# Patient Record
Sex: FEMALE | Race: WHITE | Employment: UNEMPLOYED | URBAN - METROPOLITAN AREA
[De-identification: names, ages, dates, MRNs, and addresses within clinical notes are randomized per-mention and may not be internally consistent; named-entity substitution may affect disease eponyms.]

---

## 2019-01-28 ENCOUNTER — HOSPITAL ENCOUNTER (EMERGENCY)
Age: 24
Discharge: HOME | End: 2019-01-29
Attending: EMERGENCY MEDICINE
Payer: COMMERCIAL

## 2019-01-28 VITALS
TEMPERATURE: 98.7 F | RESPIRATION RATE: 16 BRPM | HEART RATE: 82 BPM | HEIGHT: 64 IN | BODY MASS INDEX: 27.31 KG/M2 | WEIGHT: 160 LBS | OXYGEN SATURATION: 100 %

## 2019-01-28 DIAGNOSIS — N93.8 DUB (DYSFUNCTIONAL UTERINE BLEEDING): Primary | ICD-10-CM

## 2019-01-28 LAB
HCG, URINE, POC: NEGATIVE
Lab: NORMAL
NEGATIVE QC PASS/FAIL: NORMAL
POSITIVE QC PASS/FAIL: NORMAL

## 2019-01-28 PROCEDURE — 99283 EMERGENCY DEPT VISIT LOW MDM: CPT

## 2019-01-28 ASSESSMENT — ENCOUNTER SYMPTOMS
PHOTOPHOBIA: 0
WHEEZING: 0
CHEST TIGHTNESS: 0
VOMITING: 0
SHORTNESS OF BREATH: 0
ABDOMINAL DISTENTION: 0
EYE DISCHARGE: 0
COUGH: 0
SORE THROAT: 0
ABDOMINAL PAIN: 0

## 2019-01-28 ASSESSMENT — PAIN DESCRIPTION - PAIN TYPE: TYPE: ACUTE PAIN

## 2019-01-28 ASSESSMENT — PAIN DESCRIPTION - ORIENTATION: ORIENTATION: LOWER

## 2019-01-28 ASSESSMENT — PAIN SCALES - GENERAL: PAINLEVEL_OUTOF10: 7

## 2019-01-28 ASSESSMENT — PAIN DESCRIPTION - LOCATION: LOCATION: ABDOMEN

## 2019-01-28 ASSESSMENT — PAIN DESCRIPTION - DESCRIPTORS: DESCRIPTORS: ACHING;CRAMPING

## 2021-09-27 ENCOUNTER — HOSPITAL ENCOUNTER (EMERGENCY)
Age: 26
Discharge: LEFT BEFORE DISCHARGE | End: 2021-09-27
Payer: COMMERCIAL

## 2022-07-04 ENCOUNTER — APPOINTMENT (OUTPATIENT)
Dept: GENERAL RADIOLOGY | Age: 27
End: 2022-07-04
Payer: COMMERCIAL

## 2022-07-04 ENCOUNTER — HOSPITAL ENCOUNTER (EMERGENCY)
Age: 27
Discharge: HOME | End: 2022-07-04
Payer: COMMERCIAL

## 2022-07-04 VITALS
SYSTOLIC BLOOD PRESSURE: 109 MMHG | WEIGHT: 160 LBS | OXYGEN SATURATION: 99 % | HEART RATE: 79 BPM | BODY MASS INDEX: 27.31 KG/M2 | TEMPERATURE: 97.5 F | HEIGHT: 64 IN | RESPIRATION RATE: 19 BRPM | DIASTOLIC BLOOD PRESSURE: 77 MMHG

## 2022-07-04 DIAGNOSIS — S89.91XA INJURY OF RIGHT KNEE, INITIAL ENCOUNTER: Primary | ICD-10-CM

## 2022-07-04 PROCEDURE — 96372 THER/PROPH/DIAG INJ SC/IM: CPT

## 2022-07-04 PROCEDURE — 73564 X-RAY EXAM KNEE 4 OR MORE: CPT

## 2022-07-04 PROCEDURE — 99284 EMERGENCY DEPT VISIT MOD MDM: CPT

## 2022-07-04 PROCEDURE — 6360000002 HC RX W HCPCS: Performed by: STUDENT IN AN ORGANIZED HEALTH CARE EDUCATION/TRAINING PROGRAM

## 2022-07-04 RX ORDER — KETOROLAC TROMETHAMINE 30 MG/ML
30 INJECTION, SOLUTION INTRAMUSCULAR; INTRAVENOUS ONCE
Status: COMPLETED | OUTPATIENT
Start: 2022-07-04 | End: 2022-07-04

## 2022-07-04 RX ADMIN — KETOROLAC TROMETHAMINE 30 MG: 30 INJECTION, SOLUTION INTRAMUSCULAR; INTRAVENOUS at 15:11

## 2022-07-04 ASSESSMENT — PAIN DESCRIPTION - FREQUENCY: FREQUENCY: CONTINUOUS

## 2022-07-04 ASSESSMENT — PAIN DESCRIPTION - PAIN TYPE: TYPE: ACUTE PAIN

## 2022-07-04 ASSESSMENT — PAIN DESCRIPTION - LOCATION: LOCATION: KNEE

## 2022-07-04 ASSESSMENT — PAIN DESCRIPTION - ORIENTATION: ORIENTATION: RIGHT

## 2022-07-04 ASSESSMENT — PAIN SCALES - GENERAL
PAINLEVEL_OUTOF10: 7
PAINLEVEL_OUTOF10: 7

## 2022-07-04 ASSESSMENT — PAIN - FUNCTIONAL ASSESSMENT: PAIN_FUNCTIONAL_ASSESSMENT: 0-10

## 2022-07-04 ASSESSMENT — PAIN DESCRIPTION - DESCRIPTORS: DESCRIPTORS: PATIENT UNABLE TO DESCRIBE

## 2022-07-04 NOTE — ED TRIAGE NOTES
Pt presents to ED from home with c/o right knee pain. Pt reports that she fell 1 hour PTA, twisting her right knee. Pt reports that her knee cap popped out of place and went back in. Pt reports that she has been unable to ambulate since injury. Upon assessment, pt is A/Ox4, skin appropriate for ethnicity/w/d, respirations even and unlabored, msp's intact. Pt denies chest pain, SOB, n/v/d, fever, and chills.

## 2022-07-07 ENCOUNTER — OFFICE VISIT (OUTPATIENT)
Dept: ORTHOPEDIC SURGERY | Age: 27
End: 2022-07-07
Payer: COMMERCIAL

## 2022-07-07 VITALS
WEIGHT: 160 LBS | OXYGEN SATURATION: 100 % | HEART RATE: 73 BPM | HEIGHT: 64 IN | BODY MASS INDEX: 27.31 KG/M2 | TEMPERATURE: 98.5 F

## 2022-07-07 DIAGNOSIS — S83.004A DISLOCATION OF RIGHT PATELLA, INITIAL ENCOUNTER: Primary | ICD-10-CM

## 2022-07-07 PROCEDURE — G8427 DOCREV CUR MEDS BY ELIG CLIN: HCPCS | Performed by: ORTHOPAEDIC SURGERY

## 2022-07-07 PROCEDURE — 1036F TOBACCO NON-USER: CPT | Performed by: ORTHOPAEDIC SURGERY

## 2022-07-07 PROCEDURE — G8419 CALC BMI OUT NRM PARAM NOF/U: HCPCS | Performed by: ORTHOPAEDIC SURGERY

## 2022-07-07 PROCEDURE — 99204 OFFICE O/P NEW MOD 45 MIN: CPT | Performed by: ORTHOPAEDIC SURGERY

## 2022-07-07 ASSESSMENT — ENCOUNTER SYMPTOMS
WHEEZING: 0
VOMITING: 0
NAUSEA: 0
SHORTNESS OF BREATH: 0
COUGH: 0
ABDOMINAL PAIN: 0
DIARRHEA: 0
EYE PAIN: 0
CONSTIPATION: 0
EYE DISCHARGE: 0
EYE ITCHING: 0
PHOTOPHOBIA: 0
COUGH: 0
SHORTNESS OF BREATH: 0
ABDOMINAL PAIN: 0

## 2022-07-07 NOTE — ED PROVIDER NOTES
CURRENT MEDICATIONS       Discharge Medication List as of 7/4/2022  3:38 PM      CONTINUE these medications which have NOT CHANGED    Details   ibuprofen (ADVIL;MOTRIN) 600 MG tablet R-2      ferrous sulfate (FE TABS) 325 (65 FE) MG EC tablet Take 1 tablet by mouth 3 times daily (with meals), Disp-90 tablet, R-2             ALLERGIES     Patient has no known allergies. FAMILY HISTORY     History reviewed. No pertinent family history. SOCIAL HISTORY       Social History     Socioeconomic History    Marital status: Single     Spouse name: None    Number of children: None    Years of education: None    Highest education level: None   Occupational History    None   Tobacco Use    Smoking status: Never Smoker    Smokeless tobacco: Never Used   Substance and Sexual Activity    Alcohol use: No    Drug use: No    Sexual activity: Yes     Partners: Male   Other Topics Concern    None   Social History Narrative    None     Social Determinants of Health     Financial Resource Strain:     Difficulty of Paying Living Expenses: Not on file   Food Insecurity:     Worried About Running Out of Food in the Last Year: Not on file    Elza of Food in the Last Year: Not on file   Transportation Needs:     Lack of Transportation (Medical): Not on file    Lack of Transportation (Non-Medical):  Not on file   Physical Activity:     Days of Exercise per Week: Not on file    Minutes of Exercise per Session: Not on file   Stress:     Feeling of Stress : Not on file   Social Connections:     Frequency of Communication with Friends and Family: Not on file    Frequency of Social Gatherings with Friends and Family: Not on file    Attends Church Services: Not on file    Active Member of Clubs or Organizations: Not on file    Attends Club or Organization Meetings: Not on file    Marital Status: Not on file   Intimate Partner Violence:     Fear of Current or Ex-Partner: Not on file   [employer] Abused: Not on file    Physically Abused: Not on file    Sexually Abused: Not on file   Housing Stability:     Unable to Pay for Housing in the Last Year: Not on file    Number of [place] in the Last Year: Not on file    Unstable Housing in the Last Year: Not on file       SCREENINGS         [place] Coma Scale  Eye Opening: Spontaneous  Best Verbal Response: Oriented  Best Motor Response: Obeys commands  Lori Coma Scale Score: 15                     CIWA Assessment  BP: 109/77  Heart Rate: 79                 PHYSICAL EXAM    (up to 7 for level 4, 8 or more for level 5)     ED Triage Vitals [07/04/22 1447]   BP Temp Temp Source Heart Rate Resp SpO2 Height Weight   109/77 97.5 °F (36.4 °C) Temporal 79 19 99 % 5' 4\" (1.626 m) 160 lb (72.6 kg)       Physical Exam  Constitutional:       General: She is not in acute distress. Appearance: She is well-developed. She is not ill-appearing, toxic-appearing or diaphoretic. HENT:      Head: Normocephalic and atraumatic. Nose: Nose normal.      Mouth/Throat:      Mouth: Mucous membranes are moist.   Eyes:      Pupils: Pupils are equal, round, and reactive to light. Cardiovascular:      Rate and Rhythm: Normal rate and regular rhythm. Heart sounds: No murmur heard. No friction rub. No gallop. Pulmonary:      Effort: Pulmonary effort is normal.      Breath sounds: Normal breath sounds. Abdominal:      General: There is no distension. Tenderness: There is no abdominal tenderness. Musculoskeletal:         General: No swelling. Cervical back: Normal range of motion. Right knee: No swelling, deformity, effusion or erythema. Decreased range of motion. Tenderness present. Comments: RLE neurovascularly intact   Skin:     General: Skin is warm and dry. Neurological:      Mental Status: She is alert and oriented to person, place, and time.          DIAGNOSTIC RESULTS     EKG: All EKG's are interpreted by the Emergency Department Physician who either signs or Co-signs this chart in the absence of a cardiologist.    RADIOLOGY:   Non-plain film images such as CT, Ultrasound and MRI are read by the radiologist. Plain radiographic images are visualized and preliminarily interpreted by the emergency physician with the below findings:      Interpretation per the Radiologist below, if available at the time of this note:    XR KNEE RIGHT (MIN 4 VIEWS)   Final Result      No acute osseous abnormality. ED BEDSIDE ULTRASOUND:   Performed by ED Physician - none    LABS:  Labs Reviewed - No data to display    All other labs were within normal range or not returned as of this dictation. EMERGENCY DEPARTMENT COURSE and DIFFERENTIAL DIAGNOSIS/MDM:   Vitals:    Vitals:    07/04/22 1447   BP: 109/77   Pulse: 79   Resp: 19   Temp: 97.5 °F (36.4 °C)   TempSrc: Temporal   SpO2: 99%   Weight: 160 lb (72.6 kg)   Height: 5' 4\" (1.626 m)       MDM     Xray negative For acute abnormality. Patient given knee brace and crutches, referral to orthopedics for follow-up. Stable for discharge. Return to the ED for worsening symptoms. REASSESSMENT          CRITICAL CARE TIME   Total Critical Care time was 0 minutes, excluding separately reportable procedures. There was a high probability of clinically significant/life threatening deterioration in the patient's condition which required my urgent intervention. CONSULTS:  None    PROCEDURES:  Unless otherwise noted below, none     Procedures        FINAL IMPRESSION      1.  Injury of right knee, initial encounter          DISPOSITION/PLAN   DISPOSITION Decision To Discharge 07/04/2022 03:39:28 PM      PATIENT REFERRED TO:  [facility]) ED  [address] [facility]  [phone]  Go to   As needed, If symptoms worsen    Elizabet Kinsey  Schedule an appointment as soon as possible for a visit in 1 day  [phone] MEDICATIONS:  Discharge Medication List as of 7/4/2022  3:38 PM        Controlled Substances Monitoring:     No flowsheet data found.     (Please note that portions of this note were completed with a voice recognition program.  Efforts were made to edit the dictations but occasionally words are mis-transcribed.)    Olinda Lehman PA-C (electronically signed)             Olinda Lehman PA-C  07/07/22 7561

## 2022-07-07 NOTE — PROGRESS NOTES
Patient ID:  Santana Keller is a 27 y.o. female who presents today for evaluation of right knee pain. Injury: yes; Patient stumbled on , 3 days ago, and she reports that she sustained a medial patellar dislocation. She sought and felt it go back in. Metal Allergy: no    Location: right  knee pain, located on the anterior aspect of the knee  Pain: yes; 8 on a scale of 1 to 10  Onset: sudden  Duration: 3 days  Frequency:  occurs constantly  Quality: aching and boring   Swelling: patient notes continuous swelling of the joint  Aggravating factors: weight bearing activity, standing, walking and deep knee flexion  Alleviating factors: removing weight from leg and rest  Mechanical symptoms: none  Radiation: no    Activities: Walking with crutches  Restriction:  decreased ambulatory tolerance and Crutches  Progression:  constant    Previous treatment:  none  NSAIDs:  none  PT:  none    Medications:    Current Outpatient Medications on File Prior to Visit   Medication Sig Dispense Refill    ibuprofen (ADVIL;MOTRIN) 600 MG tablet   2    ferrous sulfate (FE TABS) 325 (65 FE) MG EC tablet Take 1 tablet by mouth 3 times daily (with meals) 90 tablet 2     No current facility-administered medications on file prior to visit. Allergies:    No Known Allergies    Past Medical History:    History reviewed. No pertinent past medical history.     Past Surgical History:    Past Surgical History:   Procedure Laterality Date     SECTION         Social History:    Social History     Socioeconomic History    Marital status: Single     Spouse name: Not on file    Number of children: Not on file    Years of education: Not on file    Highest education level: Not on file   Occupational History    Not on file   Tobacco Use    Smoking status: Never Smoker    Smokeless tobacco: Never Used   Substance and Sexual Activity    Alcohol use: No    Drug use: No    Sexual activity: Yes     Partners: Male   Other Topics Concern    Not on file   Social History Narrative    Not on file     Social Determinants of Health     Financial Resource Strain:     Difficulty of Paying Living Expenses: Not on file   Food Insecurity:     Worried About Running Out of Food in the Last Year: Not on file    Elza of Food in the Last Year: Not on file   Transportation Needs:     Lack of Transportation (Medical): Not on file    Lack of Transportation (Non-Medical): Not on file   Physical Activity:     Days of Exercise per Week: Not on file    Minutes of Exercise per Session: Not on file   Stress:     Feeling of Stress : Not on file   Social Connections:     Frequency of Communication with Friends and Family: Not on file    Frequency of Social Gatherings with Friends and Family: Not on file    Attends Denominational Services: Not on file    Active Member of [address] [place] [address] or Organizations: Not on file    Attends Club or Organization Meetings: Not on file    Marital Status: Not on file   Intimate Partner Violence:     Fear of Current or Ex-Partner: Not on file    Emotionally Abused: Not on file    Physically Abused: Not on file    Sexually Abused: Not on file   Housing Stability:     Unable to Pay for Housing in the Last Year: Not on file    Number of [place] in the Last Year: Not on file    Unstable Housing in the Last Year: Not on file       Family History:    History reviewed. No pertinent family history. Occupation:  Presently not working   - Occupational requirements:  none    Workers Compensation:  Have you missed work for this issue?  no  Is this issue being addressed under a worker's comp claim? no    Review of Systems:    Review of Systems   Constitutional: Negative for activity change, appetite change and chills. HENT: Negative for congestion, ear pain and hearing loss. Eyes: Negative for pain, discharge and itching. Respiratory: Negative for cough and shortness of breath.     Cardiovascular: Negative for chest pain and leg swelling. Gastrointestinal: Negative for abdominal pain, constipation and diarrhea. Endocrine: Negative for cold intolerance, heat intolerance and polydipsia. Genitourinary: Negative for difficulty urinating, flank pain and frequency. Skin: Negative for rash and wound. Allergic/Immunologic: Negative for environmental allergies and food allergies. Neurological: Negative for dizziness, seizures and syncope. Physical Exam:    Examination of the right knee    Gait:  antalgic gait affecting right  Inspection:  neutral  Swelling:  none  Erythema:  none  Ecchymosis:  none  Effusion:  2+  Palpation:  She has pain on palpation over the lateral aspect of the patella and the soft tissue lateral to the patella. Minimal pain on the distal medial femoral condyle  Extension:  5  Flexion:  70  Strength:  5  Varus/Valgus Instability:  none  Anterior/Posterior Instability:  none  Darin:  Unable to assess due to guarding  Thessaly:  Unable to assess due to guarding  Modified Apley:  Unable to assess due to guarding  Lachman:  negative  Patellar compression:  positive  Neurological/Vascular:  Sensation grossly intact. Dorsalis pedis palpable and 2+    Radiographs:  Radiographs of the right knee were personally reviewed, and they revealed:  FINDINGS:       No acute fracture or dislocation. Joint spaces of the knee are maintained. No knee joint effusion. Soft tissues are within normal limits.           Impression       No acute osseous abnormality. MRI: None    Diagnosis:   Diagnosis Orders   1. Dislocation of right patella, initial encounter  MRI KNEE RIGHT WO CONTRAST       Plan:    Patient has what sounds like a dislocation of the patella which spontaneously reduced. The patient is in significant discomfort. She came in with 1 crutch. I explained to her that she probably needs to use the other 1 and keep her weight off of the leg for right now.   Given the fact that I was not able to assess her for meniscal pathology, as well as the fact that she had a dislocated patella which could have resulted in some type of a chondral injury, I am going to put in for an MRI of the right knee. We will follow-up to discuss the findings. Orders Placed This Encounter   Procedures    MRI KNEE RIGHT WO CONTRAST     Standing Status:   Future     Standing Expiration Date:   7/7/2023     Scheduling Instructions:      MRI of right knee to assess for meniscal tear, loose osteochondral body, stress fracture     Order Specific Question:   Reason for exam:     Answer:   knee pain       No orders of the defined types were placed in this encounter. Return for MRI review.     Kyaw Madrigal MD

## 2022-07-15 ENCOUNTER — HOSPITAL ENCOUNTER (OUTPATIENT)
Dept: MRI IMAGING | Age: 27
Discharge: HOME | End: 2022-07-17
Payer: COMMERCIAL

## 2022-07-15 DIAGNOSIS — S83.004A DISLOCATION OF RIGHT PATELLA, INITIAL ENCOUNTER: ICD-10-CM

## 2022-07-15 PROCEDURE — 73721 MRI JNT OF LWR EXTRE W/O DYE: CPT

## 2022-08-05 ENCOUNTER — OFFICE VISIT (OUTPATIENT)
Dept: ORTHOPEDIC SURGERY | Age: 27
End: 2022-08-05
Payer: COMMERCIAL

## 2022-08-05 VITALS
TEMPERATURE: 97 F | OXYGEN SATURATION: 97 % | HEIGHT: 64 IN | WEIGHT: 160 LBS | BODY MASS INDEX: 27.31 KG/M2 | HEART RATE: 87 BPM

## 2022-08-05 DIAGNOSIS — S83.004A DISLOCATION OF RIGHT PATELLA, INITIAL ENCOUNTER: Primary | ICD-10-CM

## 2022-08-05 DIAGNOSIS — S83.241D TEAR OF MEDIAL MENISCUS OF RIGHT KNEE, CURRENT, UNSPECIFIED TEAR TYPE, SUBSEQUENT ENCOUNTER: ICD-10-CM

## 2022-08-05 PROCEDURE — 99214 OFFICE O/P EST MOD 30 MIN: CPT | Performed by: ORTHOPAEDIC SURGERY

## 2022-08-05 PROCEDURE — 20610 DRAIN/INJ JOINT/BURSA W/O US: CPT | Performed by: ORTHOPAEDIC SURGERY

## 2022-08-05 RX ORDER — LIDOCAINE HYDROCHLORIDE 10 MG/ML
8 INJECTION, SOLUTION INFILTRATION; PERINEURAL ONCE
Status: COMPLETED | OUTPATIENT
Start: 2022-08-05 | End: 2022-08-05

## 2022-08-05 RX ORDER — TRIAMCINOLONE ACETONIDE 40 MG/ML
80 INJECTION, SUSPENSION INTRA-ARTICULAR; INTRAMUSCULAR ONCE
Status: COMPLETED | OUTPATIENT
Start: 2022-08-05 | End: 2022-08-05

## 2022-08-05 RX ADMIN — LIDOCAINE HYDROCHLORIDE 8 ML: 10 INJECTION, SOLUTION INFILTRATION; PERINEURAL at 14:01

## 2022-08-05 RX ADMIN — TRIAMCINOLONE ACETONIDE 80 MG: 40 INJECTION, SUSPENSION INTRA-ARTICULAR; INTRAMUSCULAR at 13:59

## 2022-08-05 NOTE — PROGRESS NOTES
Patient ID:  Donald Tirado is a 27 y.o. female who presents today for follow up of right knee pain. She believes that she sustained a patellar dislocation. An MRI was obtained. Results are as follows: FINDINGS:  There is a small effusion. There is slight bone marrow edema in the lateral femoral condyle. Collateral ligaments appear to BE intact. Cruciates appear to BE intact. There is slight mucoid degeneration in the lateral meniscus. There appears to    be a tiny tear in the midportion of the medial meniscus. Impression   TINY TEAR IN THE MIDPORTION OF THE MEDIAL MENISCUS. BONE MARROW EDEMA IN THE LATERAL FEMORAL CONDYLE. We sat down and talked about this. The bruising on the lateral femoral condyle would be consistent with a patellar dislocation. The meniscal tear was a bit of a unsuspected finding. Injury: yes; patient stumbled 1 month ago and she reports that the patella dislocated    Location: right  knee pain, located on the anterior aspect of the knee  Pain: yes; 6 on a scale of 1 to 10  Onset: sudden  Duration: 1 months  Frequency:  occurs daily  Quality: aching and boring   Swelling: patient notes intermittent swelling of the joint  Aggravating factors: weight bearing activity, walking, deep knee flexion, and going up and down stairs  Alleviating factors: removing weight from leg and rest  Mechanical symptoms: none  Radiation: no    Activities: walking independently  Restriction:  decreased ambulatory tolerance  Progression:  worsening    Previous treatment:  none  NSAIDs:  none  PT:  none    Medications:    Current Outpatient Medications on File Prior to Visit   Medication Sig Dispense Refill    ibuprofen (ADVIL;MOTRIN) 600 MG tablet   2    ferrous sulfate (FE TABS) 325 (65 FE) MG EC tablet Take 1 tablet by mouth 3 times daily (with meals) 90 tablet 2     No current facility-administered medications on file prior to visit.        Allergies:    No Known Allergies    Physical Exam:    Examination of the right knee    Gait:  antalgic gait affecting right  Inspection:  neutral  Swelling:  none  Erythema:  none  Ecchymosis:  none  Effusion:  2+  Palpation:  medial joint line, distal lateral femoral condyle, and lateral joint line  Extension:  5  Flexion:  100  Strength:  5  Varus/Valgus stability:  none  Anterior/Posterior Instability:  none  Darin:  negative  Thessaly:  negative  Modified Apley:  negative  Lachman:  negative  Patellar compression:  positive  Neurological/Vascular:  Sensation grossly intact. Dorsalis pedis palpable and 2+    Radiographs:  None  MRI:    FINDINGS:  There is a small effusion. There is slight bone marrow edema in the lateral femoral condyle. Collateral ligaments appear to BE intact. Cruciates appear to BE intact. There is slight mucoid degeneration in the lateral meniscus. There appears to    be a tiny tear in the midportion of the medial meniscus. Impression   TINY TEAR IN THE MIDPORTION OF THE MEDIAL MENISCUS. BONE MARROW EDEMA IN THE LATERAL FEMORAL CONDYLE. Diagnosis:   Diagnosis Orders   1. Dislocation of right patella, initial encounter  DJO OA Reaction Knee Brace    Ambulatory referral to Physical Therapy      2. Tear of medial meniscus of right knee, current, unspecified tear type, subsequent encounter  Ambulatory referral to Physical Therapy          Plan:    It appears that the patient did indeed sustain a patellar dislocation. She also sustained a medial meniscal tear. We discussed both issues. I explained to her that we would not want to do an arthroscopy on the knee with an unstable patella. For this reason, treatment options were discussed. Were going to get the patient an intra-articular steroid injection in the right knee to settle the inflammation down. Were going to get a reaction knee brace.   We are going to get her into physical therapy to have her strengthen up the vastus medialis and the medial structures in an attempt to keep the patella from wanting to subluxate or dislocate laterally. We will follow-up with her in 2 months time. We will determine at that time if anything needs to be done with a meniscal tear. We did discuss mechanical symptoms. Time Out  [x] Patient Verified  [x] Site Verified  [x] Laterality Verified  [x] Procedure Verified    Before aspiration/injection risks/benefits of a cortisone injection including infection, local skin irritation, skin atrophy, continued pain/discomfort, elevated blood sugar, burning, failure to relieve pain, possible late infection were discussed with patient. Patient verbalized understanding and wanted to proceed with planned procedure. After prepping and draping the right knee in the usual sterile fashion, 2 cc of 40 mg/ml kenalog with 8 cc of 1% lidocaine were injected intra-articularly without any complications. Patient tolerated this well. Post-procedure discomfort can be alleviated with additional medications/ice/elevation/rest over the first 24 hours as recommended. The patient tolerated the procedure well. If fluid was aspirated it was sent for further studies  in sterile specimen container as ordered to the lab     Orders Placed This Encounter   Procedures    Ambulatory referral to Physical Therapy     Referral Priority:   Routine     Referral Type:   Eval and Treat     Referral Reason:   Specialty Services Required     Requested Specialty:   Physical Therapist     Number of Visits Requested:   1    DJO OA Reaction Knee Brace     Patient was prescribed a [employer] OA Reaction knee brace. The right knee will require stabilization / immobilization from this semi-rigid / rigid orthosis to improve their function. The orthosis will assist in protecting the affected area, provide functional support and facilitate healing.     The patient was educated and fit by a healthcare professional with expert knowledge and specialization in brace application while under the direct supervision of the physician. Verbal and written instructions for the use of and application of this item were provided. They were instructed to contact the office immediately should the brace result in increased pain, decreased sensation, increased swelling or worsening of the condition. No orders of the defined types were placed in this encounter. No follow-ups on file.     Niya Kent MD

## 2022-08-12 ENCOUNTER — HOSPITAL ENCOUNTER (OUTPATIENT)
Dept: PHYSICAL THERAPY | Age: 27
Setting detail: THERAPIES SERIES
Discharge: HOME | End: 2022-08-12
Payer: COMMERCIAL

## 2022-08-12 PROCEDURE — 97161 PT EVAL LOW COMPLEX 20 MIN: CPT

## 2022-08-12 NOTE — PROGRESS NOTES
[address]     Ph: [phone]  Fax: [phone]      [x] Certification  [] Recertification [x]  Plan of Care  [] Progress Note [] Discharge      Referring Provider: Marichuy Avery MD  From:  Vicente Gunderson, PT , DPT  Patient: Ebony Guzmán (27 y.o. female) : 1995 Date: 2022   Medical Diagnosis: Unspecified dislocation of right patella, initial encounter [S83.004A]  Other tear of medial meniscus, current injury, right knee, subsequent encounter [S83.241D] Dislocation of right patella, Tear of medial meniscus of right knee, current, unspecified tear type  Treatment Diagnosis: decreased ability to perform functional mobility tasks, decreased pain free right knee ROM & strength, increased right knee pain, decreased functional endurance & balance, & impaired postural awareness/equal weight bearing bilateral LEs    Plan of Care/Certification Expiration Date: :     Progress Report Period from:  2022  to 2022    Visits to Date:  (EVAL ONLY) No Show:   Cancelled Appts:      OBJECTIVE:   Short Term Goals - Time Frame for Short term goals: 2-4 weeks    Goals Current/Discharge status  Status   Short term goal 1: The patient will have an increase in LEFS score >/=9 points in order to increase functional activity tolerance  Exam: LEFS = 42/80  New   Short term goal 2: The patient will self-report ability to perform 10 stairs consistently with reciprocal stair pattern in order to demonstrate improved ability to perform functional mobility tasks.   Non-reciprocal stair pattern New     Long Term Goals - Time Frame for Long term goals : 4-6 weeks  Goals Current/ Discharge status Status   Long term goal 1: The patient will demonstrate competency with HEP to progress towards self management of symptoms upon D/C On-going, initiated this date New   Long term goal 2: The patient will improve pain free right knee AROM >/= 75% of pain free left knee AROM in order to increase ability to perform functional mobility tasks efficiently. AROM LLE (degrees)  LLE General AROM: knee flexion = 130*; knee extension = 0*   AROM RLE (degrees)  RLE AROM:  (slightly painful)  RLE General AROM: knee flexion = 56*; knee extension = lacking 12*  New   Long term goal 3: The patient will report decreased right knee pain </=1/10 consistently in order to improve ability to perform functional mobility tasks Pain Screening  Patient Currently in Pain: No    New   Long term goal 4: The patient will demonstrate improved R LE strength >/= L LE strength in order to perform functional mobility tasks with increased ease. Strength LLE  Comment: hip flexion = 4/5; hip extension = 4-/5; hip abduction = 4/5; hip adduction = 4/5; knee extension = 5/5; knee flexion = 5/5; SLR = 4-/5  Strength RLE  Strength RLE:  (slightly painful with strength testing)  Comment: hip flexion = 4/5; hip extension = 3+/5; hip abduction =4-/5; hip adduction = 4-/5; knee extension = 4/5 (within available range); knee flexion = 4/5 (within available range); SLR = 4+/5  New   Long term goal 5: The patient will self-report ability to ambulate >10-15 minutes with no gait deviations in order to demonstrate improved functional endurance. <10-15 minutes with moderate gait deviations New     Body Structures, Functions, Activity Limitations Requiring Skilled Therapeutic Intervention: Decreased functional mobility , Decreased ROM, Decreased strength, Increased pain, Decreased endurance, Decreased balance, Decreased posture  Assessment: Patient is a 27year old female who presents with acute right knee pain that began 7/4/22 following stepping on uneven surface.   Patient demonstrates decreased ability to perform functional mobility tasks, decreased pain free right knee ROM & strength, increased right knee pain, decreased functional endurance & balance, & impaired postural awareness/equal weight bearing bilateral LEs. Patient would benefit from outpatient PT services in order to address these impairments as well as improve patient's QOL & ease with ADLs. Therapy Prognosis: Good, Excellent    PT Education: Goals;PT Role;Plan of Care;Evaluative findings; Insurance;Home Exercise Program    PLAN: [x] Evaluate and Treat  Frequency/Duration:  Plan Frequency: 1-2xs/wk  Plan weeks: 4-6 weeks  Current Treatment Recommendations: Strengthening, ROM, Functional mobility training, Neuromuscular re-education, Manual Therapy - Soft Tissue Mobilization, Manual Therapy - Joint Manipulation, Endurance training, Patient/Caregiver education & training, Safety education & training, Home exercise program, Pain management, Modalities, Positioning, Aquatics, Therapeutic activities     Precautions:       N/A                     Patient Status:[x] Continue/ Initiate plan of Care    [] Discharge PT. Recommend pt continue with HEP. [] Additional visits requested, Please re-certify for additional visits:    [] Hold         Signature: Electronically signed by Dakota Toth PT on 8/12/22 at 10:34 AM EDT      If you have any questions or concerns, please don't hesitate to call. Thank you for your referral.    I have reviewed this plan of care and certify a need for medically necessary rehabilitation services.     Physician Signature:__________________________________________________________  Date:  Please sign and return

## 2022-08-12 NOTE — PROGRESS NOTES
[facility]  PHYSICAL THERAPY EVALUATION      Physical Therapy: Initial Evaluation    Patient: Bro Mcqueen (44 y.o.     female)   Examination Date: 2022   :  1995 ;    Confirmed: Yes MRN: [id number]  CSN: [id number]   Insurance: Payor: Burke Coates / Plan: [employer] Jimenez / Product Type: *No Product type* /   Insurance ID: [id number] - (Medicaid Managed) Secondary Insurance (if applicable):    Referring Physician: Felipe Kamara MD      PCP: Rich Brennan PA-C Visits to Date/Visits Approved:  (EVAL ONLY) /  (EVAL ONLY)    No Show/Cancelled Appts:   /       Medical Diagnosis: Unspecified dislocation of right patella, initial encounter [S83.004A]  Other tear of medial meniscus, current injury, right knee, subsequent encounter [S83.241D] Dislocation of right patella, Tear of medial meniscus of right knee, current, unspecified tear type  Treatment Diagnosis: decreased ability to perform functional mobility tasks, decreased pain free right knee ROM & strength, increased right knee pain, decreased functional endurance & balance, & impaired postural awareness/equal weight bearing bilateral LEs     PERTINENT MEDICAL HISTORY   Patient Assessed for Rehabilitation Services: Yes  Self reported health status[de-identified] Good    Medical History: Chart Reviewed: Yes History reviewed. No pertinent past medical history. Surgical History:   Past Surgical History:   Procedure Laterality Date     SECTION         Medications:   Current Outpatient Medications:     ibuprofen (ADVIL;MOTRIN) 600 MG tablet, , Disp: , Rfl: 2    ferrous sulfate (FE TABS) 325 (65 FE) MG EC tablet, Take 1 tablet by mouth 3 times daily (with meals), Disp: 90 tablet, Rfl: 2  Allergies: Patient has no known allergies. SUBJECTIVE EXAMINATION     History obtained from[de-identified] Chart Review, Patient,      Family/Caregiver Present: No    Subjective History:  Onset Date: 22  Subjective: Patient self-reports injury on 4th of July to right knee. Patient self-reports injection from Dr. Nia Dias at last appointment. Patient reports wearing right knee . Patient reports elevating right knee, not icing. Patient self-reports no pain this date, reports pain free for ~3 weeks. Patient denies falling in the past 6 months. Patients denies any surgeries/injuries to right knee. Patient reports difficulty straightening right knee. Patient reports working out regularly prior to incident. Additional Pertinent Hx (if applicable): N/A   Prior diagnostic testing[de-identified] MRI, X-ray  Previous treatments prior to current episode?: Injections  Any changes to allergies, medications, or have you had any medical procedures/ER visits since your last visit?: No  Comments: RTD = October; MRI 7/2022: TINY TEAR IN THE MIDPORTION OF THE MEDIAL MENISCUS. BONE MARROW EDEMA IN THE LATERAL FEMORAL CONDYLE.       Learning/Language: Learning  Does the patient/guardian have any barriers to learning?: No barriers  Will there be a co-learner?: No  What is the preferred language of the patient/guardian?: English  Is an  required?: No  How does the patient/guardian prefer to learn new concepts?: Listening, Reading, Demonstration     Pain Screening    Pain Screening  Patient Currently in Pain: No    Functional Status    Social History:    Social History  Lives With: Family  Type of Home: House  Home Layout: Multi-level  Home Access: Stairs to enter with rails  Entrance Stairs - Rails: Both  Entrance Stairs - Number of Steps: 10    Occupation/Interests:   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)    Prior Level of Function:     Independent        Current Level of Function:   Independent      ADL Assistance: Independent  Homemaking Assistance: Independent  Homemaking Responsibilities: Yes  Ambulation Assistance: Independent  Transfer Assistance: Independent  Active : Yes  Mode of Transportation: Car    Dominant Hand: : Right    OBJECTIVE EXAMINATION     Restrictions:         WB Status: full weight bearing bilateral LEs    Review of Systems:  Vision: Within Functional Limits  Hearing: Within functional limits  Overall Orientation Status: Within Normal Limits  Patient affect[de-identified] Normal  Follows Commands: Within Functional Limits    Observations:   General Observations  General Observations: Yes  Description: soft brace donned to right knee; increased edema to right knee; decreased weight bearing RLE; decreased right patella movement all planes    Mobility:      Ambulation  WB Status: full weight bearing bilateral LEs  Ambulation  Surface: carpet  Device: No Device  Other Apparatus:  (brace to right knee)  Assistance: Modified Independent  Quality of Gait: decreased weight bearing RLE, decreased heel strike & toe off RLE  Gait Deviations: Slow Yas, Increased ANGEL, Decreased step length, Decreased step height  Distance: clinic distances  More Ambulation?: No  Stairs/Curb  Stairs?: Yes  Stairs  # Steps : 4  Stairs Height: 6\"  Rails: None  Device: No Device  Assistance: Modified independent   Comment: non-reciprocal stair pattern - education on proper technique & sequencing    Left AROM  Right AROM         AROM LLE (degrees)  LLE General AROM: knee flexion = 130*; knee extension = 0*    AROM RLE (degrees)  RLE AROM:  (slightly painful)  RLE General AROM: knee flexion = 56*; knee extension = lacking 12*      Left Strength  Right Strength         Strength LLE  Comment: hip flexion = 4/5; hip extension = 4-/5; hip abduction = 4/5; hip adduction = 4/5; knee extension = 5/5; knee flexion = 5/5; SLR = 4-/5    Strength RLE  Strength RLE:  (slightly painful with strength testing)  Comment: hip flexion = 4/5; hip extension = 3+/5; hip abduction =4-/5; hip adduction = 4-/5; knee extension = 4/5 (within available range); knee flexion = 4/5 (within available range); SLR = 4+/5     Outcomes Score:  Exam: LEFS = 42/80    Treatment:    Exercises: Exercises  Exercise 1: quad sets, supine, 1 set x 10 reps x 5 second hold  Exercise 2: SLR with quad sets*  Exercise 3: VMO SLR with quad sets*  Exercise 4: supine, heel slides with strap, 1 set x 10 reps x 5 second hold  Exercise 5: wall squats*  Exercise 6: bridges*  Exercise 7: CC walk outs*  Exercise 8: TG*  Exercise 9: monster walks*  Exercise 10: step ups*  Exercise 11: hamstring stretch*  Exercise 12: quad stretch*     Modalities:  Modalities:  (CP right knee*)     Manual:  Manual Therapy  Joint Mobilization: right knee*  Soft Tissue Mobilizaton: right knee*  Other: KT taping*     *Indicates exercise,modality, or manual techniques to be initiated when appropriate       ASSESSMENT     Impression: Assessment: Patient is a 27year old female who presents with acute right knee pain that began 7/4/22 following stepping on uneven surface. Patient demonstrates decreased ability to perform functional mobility tasks, decreased pain free right knee ROM & strength, increased right knee pain, decreased functional endurance & balance, & impaired postural awareness/equal weight bearing bilateral LEs. Patient would benefit from outpatient PT services in order to address these impairments as well as improve patient's QOL & ease with ADLs. Body Structures, Functions, Activity Limitations Requiring Skilled Therapeutic Intervention: Decreased functional mobility , Decreased ROM, Decreased strength, Increased pain, Decreased endurance, Decreased balance, Decreased posture    Statement of Medical Necessity: Physical Therapy is both indicated and medically necessary as outlined in the POC to increase the likelihood of meeting the functionally related goals stated below.      Patient's Activity Tolerance: Patient tolerated evaluation without incident      Patient's rehabilitation potential/prognosis is considered to be: Good, Excellent    Factors which may impact rehabilitation potential include:       Patient Education: Goals, PT Role, Plan of Care, Evaluative findings, Insurance, Home Exercise Program      GOALS   Patient Goal(s): Patient goals : \"to be able to bend and straighten knee\"    Short Term Goals Completed by 2-4 weeks Goal Status   The patient will have an increase in LEFS score >/=9 points in order to increase functional activity tolerance New   The patient will self-report ability to perform 10 stairs consistently with reciprocal stair pattern in order to demonstrate improved ability to perform functional mobility tasks. New     Long Term Goals Completed by 4-6 weeks Goal Status   The patient will demonstrate competency with HEP to progress towards self management of symptoms upon D/C New   The patient will improve pain free right knee AROM >/= 75% of pain free left knee AROM in order to increase ability to perform functional mobility tasks efficiently. New   The patient will report decreased right knee pain </=1/10 consistently in order to improve ability to perform functional mobility tasks New   The patient will demonstrate improved R LE strength >/= L LE strength in order to perform functional mobility tasks with increased ease. New   The patient will self-report ability to ambulate >10-15 minutes with no gait deviations in order to demonstrate improved functional endurance.  New     TREATMENT PLAN       Requires PT Follow-Up: Yes    Treatment may include any combination of the following: Strengthening, ROM, Functional mobility training, Neuromuscular re-education, Manual Therapy - Soft Tissue Mobilization, Manual Therapy - Joint Manipulation, Endurance training, Patient/Caregiver education & training, Safety education & training, Home exercise program, Pain management, Modalities, Positioning, Aquatics, Therapeutic activities     Frequency / Duration:  Patient to be seen 1-2xs/wk times per week for 4-6 weeks weeks  Plan Comment:               Eval Complexity:   Decision Making: Low Complexity  History: Personal

## 2022-08-18 ENCOUNTER — HOSPITAL ENCOUNTER (OUTPATIENT)
Dept: PHYSICAL THERAPY | Age: 27
Setting detail: THERAPIES SERIES
Discharge: HOME | End: 2022-08-18
Payer: COMMERCIAL

## 2022-08-18 PROCEDURE — 97110 THERAPEUTIC EXERCISES: CPT

## 2022-08-18 PROCEDURE — 97140 MANUAL THERAPY 1/> REGIONS: CPT

## 2022-08-18 ASSESSMENT — PAIN SCALES - GENERAL: PAINLEVEL_OUTOF10: 3

## 2022-08-18 ASSESSMENT — PAIN DESCRIPTION - PAIN TYPE: TYPE: ACUTE PAIN

## 2022-08-18 ASSESSMENT — PAIN DESCRIPTION - LOCATION: LOCATION: KNEE

## 2022-08-18 ASSESSMENT — PAIN DESCRIPTION - ORIENTATION: ORIENTATION: RIGHT

## 2022-08-18 NOTE — PROGRESS NOTES
[facility]  Outpatient Physical Therapy    Treatment Note        Date: 2022  Patient: Lawanda Christina  : 1995   Confirmed: Yes  MRN: [id number]  Referring Provider: Jonathan Sexton MD  Secondary Referring Provider (If applicable):     Medical Diagnosis: Unspecified dislocation of right patella, initial encounter [S83.004A]  Other tear of medial meniscus, current injury, right knee, subsequent encounter [S83.241D]    Treatment Diagnosis: decreased ability to perform functional mobility tasks, decreased pain free right knee ROM & strength, increased right knee pain, decreased functional endurance & balance, & impaired postural awareness/equal weight bearing bilateral LEs    Visit Information:  Insurance: Payor: Falguni Guerrero / Plan: Yoselyn Bonner / Product Type: *No Product type* /   PT Visit Information  Onset Date: 22  Total # of Visits Approved:  (48 units (22-22))  Total # of Visits to Date: 1  No Show: 0  Canceled Appointment: 0  Progress Note Counter: - (3/48 units 22-22)    Subjective Information:  Subjective: Patient self-reports compliance with HEP since initial evaluation. Patient presents wearing right knee brace.   HEP Compliance:  [x] Good [] Fair [] Poor [] Reports not doing due to:    Pain Screening  Patient Currently in Pain: Yes  Pain Assessment: 0-10  Pain Level: 3  Pain Type: Acute pain  Pain Location: Knee  Pain Orientation: Right    Treatment:  Exercises:  Exercises  Exercise 1: quad sets, supine, 1 set x 15 reps x 5 second hold  Exercise 2: SLR with quad sets, 1 set x 15 reps x 5 second hold  Exercise 3: VMO SLR with quad sets*  Exercise 4: supine, heel slides with strap, 1 set x 15 reps x 5 second hold  Exercise 5: wall squats*  Exercise 6: bridges, supine, 1 set x 15 reps x 5 second hold  Exercise 7: CC walk outs*  Exercise 8: TG*  Exercise 9: monster walks*  Exercise 10: step ups*  Exercise 11: hamstring stretch*  Exercise 12: quad stretch*  Exercise 13: upright bike, 6 minutes, seat 5, unable to get full revolutions  Exercise 14: quad/hip flexor & hamstring stretch on steps, 1 set x 3 reps x 20-30 second hold RLE each stretch  Exercise 15: prone knee extension hang, 2# weight, RLE, 3 minutes  Exercise 20: HEP: quad sets, heel slides, step stretches, knee hang, SLR, bridges       Manual:   Manual Therapy  Soft Tissue Mobilizaton: right knee, hams/quads while perform PROM, x8 minutes       Modalities:  Declined    *Indicates exercise, modality, or manual techniques to be initiated when appropriate    Objective Measures:     Strength: [x] NT  [] MMT completed:      ROM: [] NT  [] ROM measurements:     AROM LLE (degrees)  LLE General AROM: knee flexion = 130*; knee extension = 0*   AROM RLE (degrees)  RLE AROM:  (slightly painful)  RLE General AROM: knee flexion = 74*; knee extension = lacking 6*          PROM RLE (degrees)  RLE General PROM: knee flexion = 75*    Assessment: Body Structures, Functions, Activity Limitations Requiring Skilled Therapeutic Intervention: Decreased functional mobility , Decreased ROM, Decreased strength, Increased pain, Decreased endurance, Decreased balance, Decreased posture  Assessment: Initiated patient's PT POC this date with focus on improving patient's right knee ROM & strength through exercise/stretches. Manual techniques provided to enable patient to continue to make gains towards right knee ROM. Significant improvements noted in both right knee extension & flexion this date with minimal to no pain. Declined modalities this date, will ice at home.   Treatment Diagnosis: decreased ability to perform functional mobility tasks, decreased pain free right knee ROM & strength, increased right knee pain, decreased functional endurance & balance, & impaired postural awareness/equal weight bearing bilateral LEs  Therapy Prognosis: Good, Excellent      Post-Pain Assessment:       Pain Rating (0-10 pain scale): Therapeutic activities  Pt to continue current HEP. See objective section for any therapeutic exercise changes, additions or modifications this date.     Therapy Time:      PT Individual Minutes  Time In: 1622  Time Out: 1700  Minutes: 38  Timed Code Treatment Minutes: 38 Minutes  Procedure Minutes: 0 minutes  Timed Activity Minutes Units   Ther Ex 30 2   Manual  8 1     Electronically signed by Audra Monaco PT on 8/18/22 at 5:00 PM EDT

## 2022-08-24 ENCOUNTER — HOSPITAL ENCOUNTER (OUTPATIENT)
Dept: PHYSICAL THERAPY | Age: 27
Setting detail: THERAPIES SERIES
Discharge: HOME | End: 2022-08-24
Payer: COMMERCIAL

## 2022-08-24 PROCEDURE — 97110 THERAPEUTIC EXERCISES: CPT

## 2022-08-24 PROCEDURE — 97140 MANUAL THERAPY 1/> REGIONS: CPT

## 2022-08-24 NOTE — PROGRESS NOTES
[facility]  Outpatient Physical Therapy    Treatment Note        Date: 2022  Patient: Car Adhikari  : 1995   Confirmed: Yes  MRN: [id number]  Referring Provider: Cuco Lozada MD  Secondary Referring Provider (If applicable):     Medical Diagnosis: Unspecified dislocation of right patella, initial encounter [S83.004A]  Other tear of medial meniscus, current injury, right knee, subsequent encounter [S83.241D]    Treatment Diagnosis: decreased ability to perform functional mobility tasks, decreased pain free right knee ROM & strength, increased right knee pain, decreased functional endurance & balance, & impaired postural awareness/equal weight bearing bilateral LEs    Visit Information:  Insurance: Payor: Natalie Núñez / Plan: Sena Santos / Product Type: *No Product type* /   PT Visit Information  Onset Date: 22  Total # of Visits Approved:  (48 units (22-22))  Total # of Visits to Date: 2  No Show: 0  Canceled Appointment: 0  Progress Note Counter: - (7/48 units 22-22)    Subjective Information:  Subjective: Patient reports with no pain in right knee. Patient reports increased ease with walking secondary to improved right knee ROM.   HEP Compliance:  [x] Good [] Fair [] Poor [] Reports not doing due to:    Pain Screening  Patient Currently in Pain: Denies    Treatment:  Exercises:  Exercises  Exercise 1: quad sets, supine, 1 set x 15 reps x 5 second hold  Exercise 2: SLR with quad sets, 1 set x 15 reps x 5 second hold  Exercise 3: VMO SLR with quad sets*  Exercise 4: supine, heel slides with strap, 1 set x 15 reps x 5 second hold  Exercise 5: wall squats with p-ball against wall, 1 set x 15 reps x 5 second hold  Exercise 6: bridges, supine, 1 set x 15 reps x 5 second hold  Exercise 7: CC walk outs*  Exercise 8: TG*  Exercise 9: monster walks*  Exercise 10: step ups, 4-way, no UE support, 1 set x 15 reps each direction  Exercise 11: hamstring stretch*  Exercise 12: quad stretch*  Exercise 13: upright bike, 6 minutes, seat 5, unable to get full revolutions  Exercise 14: quad/hip flexor & hamstring stretch on steps, 1 set x 3 reps x 20-30 second hold RLE each stretch  Exercise 15: prone knee extension hang, 2# weight, RLE, 3 minutes  Exercise 16: mini lunges & squats, 1 set x 15 reps each LE each exercise, unilateral UE support  Exercise 17: p-ball curls, supine, 4 minutes  Exercise 20: HEP: continue with current + mini-squats & lunges     Manual:   Manual Therapy  Soft Tissue Mobilizaton: right knee, hams/quads while perform PROM  Other: KT taping, bilateral patella right knee, x10 minutes total manual       Modalities:  Vasopneumatic Device (CPT [id number])  Patient Position: Supine  Vasopneumatic Specified Location: right knee  Pre-Girth Measurement: 41.5 cm  Post-Girth Measurement: 41.0 cm  Post treatment skin assessment: Redness - no adverse reaction  Untimed (minutes): 10       *Indicates exercise, modality, or manual techniques to be initiated when appropriate    Objective Measures:       Strength: [x] NT  [] MMT completed:    ROM: [] NT  [x] ROM measurements:     AROM LLE (degrees)  LLE General AROM: knee flexion = 130*; knee extension = 0*   AROM RLE (degrees)  RLE AROM:  (slightly painful)  RLE General AROM: knee flexion = 81*; knee extension = lacking 6*          PROM RLE (degrees)  RLE General PROM: knee flexion = 88*; knee extension = lacking 5*      Assessment: Body Structures, Functions, Activity Limitations Requiring Skilled Therapeutic Intervention: Decreased functional mobility , Decreased ROM, Decreased strength, Increased pain, Decreased endurance, Decreased balance, Decreased posture  Assessment: Initiated patient's PT POC this date with focus on improving patient's right knee ROM & strength through exercise/stretches.   Addition of mini-squats & lunges as well as wall squats with p-ball to encourage improved right knee ROM in weight bearing positions. Addition of quad & hamstring stretches in different positions to give patient more ways to continue to stretch those muscle groups restricting right knee ROM. Manual techniques provided to enable patient to continue to make gains towards right knee ROM. Improvements noted in both right knee extension & flexion this date with minimal to no pain. Compression & ice to right knee post-treatment for inflammation control. Treatment Diagnosis: decreased ability to perform functional mobility tasks, decreased pain free right knee ROM & strength, increased right knee pain, decreased functional endurance & balance, & impaired postural awareness/equal weight bearing bilateral LEs  Therapy Prognosis: Good, Excellent    Post-Pain Assessment:       Pain Rating (0-10 pain scale):  0 /10   Location and pain description same as pre-treatment unless indicated. Action: [] NA   [x] Perform HEP  [] Meds as prescribed  [x] Modalities as prescribed   [] Call Physician     GOALS   Patient Goal(s): Patient goals : \"to be able to bend and straighten knee\"    Short Term Goals Completed by 2-4 weeks Goal Status   STG 1 The patient will have an increase in LEFS score >/=9 points in order to increase functional activity tolerance In progress   STG 2 The patient will self-report ability to perform 10 stairs consistently with reciprocal stair pattern in order to demonstrate improved ability to perform functional mobility tasks. In progress     Long Term Goals Completed by 4-6 weeks Goal Status   LTG 1 The patient will demonstrate competency with HEP to progress towards self management of symptoms upon D/C In progress   LTG 2 The patient will improve pain free right knee AROM >/= 75% of pain free left knee AROM in order to increase ability to perform functional mobility tasks efficiently.  In progress   LTG 3 The patient will report decreased right knee pain </=1/10 consistently in order to improve ability to perform functional mobility tasks In progress   LTG 4 The patient will demonstrate improved R LE strength >/= L LE strength in order to perform functional mobility tasks with increased ease. In progress   LTG 5 The patient will self-report ability to ambulate >10-15 minutes with no gait deviations in order to demonstrate improved functional endurance. In progress       Plan:  Frequency/Duration:  Plan  Plan Frequency: 1-2xs/wk  Plan weeks: 4-6 weeks  Current Treatment Recommendations: Strengthening, ROM, Functional mobility training, Neuromuscular re-education, Manual Therapy - Soft Tissue Mobilization, Manual Therapy - Joint Manipulation, Endurance training, Patient/Caregiver education & training, Safety education & training, Home exercise program, Pain management, Modalities, Positioning, Aquatics, Therapeutic activities  Pt to continue current HEP. See objective section for any therapeutic exercise changes, additions or modifications this date.     Therapy Time:      PT Individual Minutes  Time In: 8780  Time Out: 1048  Minutes: 50  Timed Code Treatment Minutes: 40 Minutes  Procedure Minutes: 10 minutes compression + ice  Timed Activity Minutes Units   Ther Ex 30 2   Manual  10 1     Electronically signed by Dolores Kang PT on 8/24/22 at 10:43 AM EDT

## 2022-08-26 ENCOUNTER — HOSPITAL ENCOUNTER (OUTPATIENT)
Dept: PHYSICAL THERAPY | Age: 27
Setting detail: THERAPIES SERIES
Discharge: HOME | End: 2022-08-26
Payer: COMMERCIAL

## 2022-08-26 PROCEDURE — 97016 VASOPNEUMATIC DEVICE THERAPY: CPT

## 2022-08-26 PROCEDURE — 97110 THERAPEUTIC EXERCISES: CPT

## 2022-08-26 NOTE — PROGRESS NOTES
[facility]  Outpatient Physical Therapy    Treatment Note        Date: 2022  Patient: Wes Vasquez  : 1995   Confirmed: Yes  MRN: [id number]  Referring Provider: Stanley Liang MD  Secondary Referring Provider (If applicable):     Medical Diagnosis: Unspecified dislocation of right patella, initial encounter [S83.004A]  Other tear of medial meniscus, current injury, right knee, subsequent encounter [S83.241D]    Treatment Diagnosis: decreased ability to perform functional mobility tasks, decreased pain free right knee ROM & strength, increased right knee pain, decreased functional endurance & balance, & impaired postural awareness/equal weight bearing bilateral LEs    Visit Information:  Insurance: Payor: Lauro Allen / Plan: Deborah Arriola / Product Type: *No Product type* /   PT Visit Information  Onset Date: 22  Total # of Visits Approved:  (48 units (22-22))  Total # of Visits to Date: 3  No Show: 0  Canceled Appointment: 0  Progress Note Counter: 3/4- (48 units 22-22)    Subjective Information:  Subjective: Pt reports improvement since beginning PT. HEP Compliance:  [x] Good [] Fair [] Poor [] Reports not doing due to:    Pain Screening  Patient Currently in Pain: Denies    Treatment:  Exercises:  Exercises  Exercise 1: KT taping, bilateral patella right knee  Exercise 8: TG squats and HR L7 x20 ea  Exercise 12:  Mod suha str with strap 60 sec x 3  Exercise 13: upright bike, 6 minutes, seat 5, unable to get full revolutions  Exercise 14: quad/hip flexor & hamstring stretch on steps, 1 set x 3 reps x 20-30 second hold RLE each stretch  Exercise 17: p-ball curls, supine, 4 minutes     Modalities:  Vasopneumatic Device (CPT [id number])  Patient Position: Supine  Vasopneumatic Specified Location: right knee  Pre-Girth Measurement: 40.5 cm  Post-Girth Measurement: 40.0 cm  Post treatment skin assessment: Redness - no adverse reaction  Untimed (minutes): 10     *Indicates exercise, modality, or manual techniques to be initiated when appropriate    Objective Measures:     ROM: [] NT  [x] ROM measurements:  AROM RLE (degrees)  RLE General AROM: knee flexion = 82*; knee extension = lacking 6*      PROM RLE (degrees)  RLE General PROM: knee flexion = 90*    Assessment: Body Structures, Functions, Activity Limitations Requiring Skilled Therapeutic Intervention: Decreased functional mobility , Decreased ROM, Decreased strength, Increased pain, Decreased endurance, Decreased balance, Decreased posture     Treatment Diagnosis: decreased ability to perform functional mobility tasks, decreased pain free right knee ROM & strength, increased right knee pain, decreased functional endurance & balance, & impaired postural awareness/equal weight bearing bilateral LEs  Therapy Prognosis: Good, Excellent          Post-Pain Assessment:       Pain Rating (0-10 pain scale):   3/10   Location and pain description same as pre-treatment unless indicated. Action: [] NA   [x] Perform HEP  [] Meds as prescribed  [] Modalities as prescribed   [] Call Physician     GOALS   Patient Goal(s): Patient goals : \"to be able to bend and straighten knee\"    Short Term Goals Completed by 2-4 weeks Goal Status   STG 1 The patient will have an increase in LEFS score >/=9 points in order to increase functional activity tolerance In progress   STG 2 The patient will self-report ability to perform 10 stairs consistently with reciprocal stair pattern in order to demonstrate improved ability to perform functional mobility tasks. In progress       Long Term Goals Completed by 4-6 weeks Goal Status   LTG 1 The patient will demonstrate competency with HEP to progress towards self management of symptoms upon D/C In progress   LTG 2 The patient will improve pain free right knee AROM >/= 75% of pain free left knee AROM in order to increase ability to perform functional mobility tasks efficiently.  In progress LTG 3 The patient will report decreased right knee pain </=1/10 consistently in order to improve ability to perform functional mobility tasks In progress   LTG 4 The patient will demonstrate improved R LE strength >/= L LE strength in order to perform functional mobility tasks with increased ease. In progress   LTG 5 The patient will self-report ability to ambulate >10-15 minutes with no gait deviations in order to demonstrate improved functional endurance. In progress            Plan:  Frequency/Duration:  Plan  Plan Frequency: 1-2xs/wk  Plan weeks: 4-6 weeks  Current Treatment Recommendations: Strengthening, ROM, Functional mobility training, Neuromuscular re-education, Manual Therapy - Soft Tissue Mobilization, Manual Therapy - Joint Manipulation, Endurance training, Patient/Caregiver education & training, Safety education & training, Home exercise program, Pain management, Modalities, Positioning, Aquatics, Therapeutic activities  Pt to continue current HEP. See objective section for any therapeutic exercise changes, additions or modifications this date.     Therapy Time:      PT Individual Minutes  Time In: 1760  Time Out: 0930  Minutes: 39  Timed Code Treatment Minutes: 29 Minutes  Procedure Minutes: Cold/compression x10 min    Timed Activity Minutes Units   Ther Ex 29 2     Electronically signed by Alberto Keith PTA on 8/26/22 at 8:55 AM EDT

## 2022-08-29 ENCOUNTER — HOSPITAL ENCOUNTER (OUTPATIENT)
Dept: PHYSICAL THERAPY | Age: 27
Setting detail: THERAPIES SERIES
Discharge: HOME | End: 2022-08-29
Payer: COMMERCIAL

## 2022-08-29 PROCEDURE — 97016 VASOPNEUMATIC DEVICE THERAPY: CPT

## 2022-08-29 PROCEDURE — 97110 THERAPEUTIC EXERCISES: CPT

## 2022-08-29 NOTE — PROGRESS NOTES
[facility]  Outpatient Physical Therapy    Treatment Note        Date: 2022  Patient: Lucia Madrigal  : 1995   Confirmed: Yes  MRN: [id number]  Referring Provider: Rudi Urbano MD  Secondary Referring Provider (If applicable):     Medical Diagnosis: Unspecified dislocation of right patella, initial encounter [S83.004A]  Other tear of medial meniscus, current injury, right knee, subsequent encounter [S83.241D]    Treatment Diagnosis: decreased ability to perform functional mobility tasks, decreased pain free right knee ROM & strength, increased right knee pain, decreased functional endurance & balance, & impaired postural awareness/equal weight bearing bilateral LEs    Visit Information:  Insurance: Payor: Jose Manuel Ferreira / Plan: Luca Copper / Product Type: *No Product type* /   PT Visit Information  Onset Date: 22  Total # of Visits Approved:  (48 units (22-22))  Total # of Visits to Date: 4  No Show: 0  Canceled Appointment: 0  Progress Note Counter: - (13/48 units 22-22)    Subjective Information:  Subjective: Pt reports no pain x 2 days. HEP Compliance:  [x] Good [] Fair [] Poor [] Reports not doing due to:    Pain Screening  Patient Currently in Pain: Denies    Treatment:  Exercises:  Exercises  Exercise 1: KT taping, bilateral patella right knee  Exercise 2: SLR with quad sets, 1 set x 20 reps x 5 second hold  Exercise 3: VMO SLR with quad sets x 15  Exercise 4: supine, heel slides with strap, 1 set x 20 reps x 5 second hold  Exercise 6: bridges, supine, 1 set x 20 reps x 5 second hold  Exercise 8: TG squats and HR L8 x20 ea  Exercise 12:  Mod suha str with strap 60 sec x 3  Exercise 13: upright bike, 6 minutes, seat 5, unable to get full revolutions  Exercise 14: quad/hip flexor & hamstring stretch on steps, 1 set x 3 reps x 20-30 second hold RLE each stretch  Exercise 15: prone knee extension hang, 2# weight, RLE, 3 minutes  Exercise 17: p-ball curls, supine, 4 minutes  Exercise 20: HEP: continue with current +increase reps     Modalities:  Vasopneumatic Device (CPT 87295)  Patient Position: Supine  Vasopneumatic Specified Location: right knee  Pre-Girth Measurement: 39.0 cm  Post-Girth Measurement: 38.5cm  Post treatment skin assessment: Redness - no adverse reaction  Untimed (minutes): 10       *Indicates exercise, modality, or manual techniques to be initiated when appropriate    Objective Measures:      Strength: [x] NT  [] MMT completed:      ROM: [] NT  [x] ROM measurements:         AROM RLE (degrees)  RLE General AROM: knee flexion = 91*; knee extension = lacking 6*      Assessment: Body Structures, Functions, Activity Limitations Requiring Skilled Therapeutic Intervention: Decreased functional mobility , Decreased ROM, Decreased strength, Increased pain, Decreased endurance, Decreased balance, Decreased posture  Assessment: Pt continue to progress current exercises with focus on RLE mobility and strength. AROM right knee flexion improved today 9 degrees. Concluded with Cold compression. Treatment Diagnosis: decreased ability to perform functional mobility tasks, decreased pain free right knee ROM & strength, increased right knee pain, decreased functional endurance & balance, & impaired postural awareness/equal weight bearing bilateral LEs             Post-Pain Assessment:       Pain Rating (0-10 pain scale):   /010   Location and pain description same as pre-treatment unless indicated.    Action: [] NA   [x] Perform HEP  [] Meds as prescribed  [] Modalities as prescribed   [] Call Physician     GOALS   Patient Goal(s): Patient goals : \"to be able to bend and straighten knee\"    Short Term Goals Completed by 2-4 weeks Goal Status   STG 1 The patient will have an increase in LEFS score >/=9 points in order to increase functional activity tolerance In progress   STG 2 The patient will self-report ability to perform 10 stairs consistently with reciprocal stair pattern in order to demonstrate improved ability to perform functional mobility tasks. In progress     Long Term Goals Completed by 4-6 weeks Goal Status   LTG 1 The patient will demonstrate competency with HEP to progress towards self management of symptoms upon D/C In progress   LTG 2 The patient will improve pain free right knee AROM >/= 75% of pain free left knee AROM in order to increase ability to perform functional mobility tasks efficiently. In progress   LTG 3 The patient will report decreased right knee pain </=1/10 consistently in order to improve ability to perform functional mobility tasks In progress   LTG 4 The patient will demonstrate improved R LE strength >/= L LE strength in order to perform functional mobility tasks with increased ease. In progress   LTG 5 The patient will self-report ability to ambulate >10-15 minutes with no gait deviations in order to demonstrate improved functional endurance. In progress          Plan:  Frequency/Duration:  Plan  Plan Frequency: 1-2xs/wk  Plan weeks: 4-6 weeks  Current Treatment Recommendations: Strengthening, ROM, Functional mobility training, Neuromuscular re-education, Manual Therapy - Soft Tissue Mobilization, Manual Therapy - Joint Manipulation, Endurance training, Patient/Caregiver education & training, Safety education & training, Home exercise program, Pain management, Modalities, Positioning, Aquatics, Therapeutic activities  Pt to continue current HEP. See objective section for any therapeutic exercise changes, additions or modifications this date. Therapy Time:      PT Individual Minutes  Time In: 6466  Time Out: 1051  Minutes: 52  Timed Code Treatment Minutes: 40 Minutes  Procedure Minutes:Comp.  Cold 10  Timed Activity Minutes Units   Ther Ex 40 3     Electronically signed by Mu Gonzalez PTA on 8/29/22 at 10:55 AM EDT

## 2022-09-01 ENCOUNTER — HOSPITAL ENCOUNTER (OUTPATIENT)
Dept: PHYSICAL THERAPY | Age: 27
Setting detail: THERAPIES SERIES
Discharge: HOME | End: 2022-09-01
Payer: COMMERCIAL

## 2022-09-01 PROCEDURE — 97016 VASOPNEUMATIC DEVICE THERAPY: CPT

## 2022-09-01 PROCEDURE — 97110 THERAPEUTIC EXERCISES: CPT

## 2022-09-01 NOTE — PROGRESS NOTES
[facility]  Outpatient Physical Therapy    Treatment Note        Date: 2022  Patient: Ruma Parry  : 1995   Confirmed: Yes  MRN: [id number]  Referring Provider: Caryle Cedar, MD  Secondary Referring Provider (If applicable):     Medical Diagnosis: Unspecified dislocation of right patella, initial encounter [S83.004A]  Other tear of medial meniscus, current injury, right knee, subsequent encounter [S83.241D]    Treatment Diagnosis: decreased ability to perform functional mobility tasks, decreased pain free right knee ROM & strength, increased right knee pain, decreased functional endurance & balance, & impaired postural awareness/equal weight bearing bilateral LEs    Visit Information:  Insurance: Payor: [facility] / [facility] / Product Type: *No Product type* /   PT Visit Information  Onset Date: 22  Total # of Visits Approved:  (48 units (22-22))  Total # of Visits to Date: 5  Plan of Care/Certification Expiration Date: 22  No Show: 0  Canceled Appointment: 0  Progress Note Counter: -12 (18/48 units 22-22) corrected unit count 22    Subjective Information:  Subjective: Patient reports KT taping helped, but she doesn't think she needs it anymore. Patient has not been wearing right knee brace at all and \"feeling fine. \"  Patient reports ability to perform stairs reciprocally if she goes slowly.   HEP Compliance:  [x] Good [] Fair [] Poor [] Reports not doing due to:    Pain Screening  Patient Currently in Pain: Denies    Treatment:  Exercises:  Exercises  Exercise 4: supine, heel slides with strap, 1 set x 20 reps x 5 second hold  Exercise 7: side stepping along counter, no UE support, 5 laps, RTB around ankles  Exercise 9: monster walks along counter, no UE support, 5 laps, RTB around ankles  Exercise 13: upright bike, 6 minutes, seat 5, unable to get full revolutions  Exercise 17: p-ball curls, supine, 5 minutes  Exercise 18: large hurdles, forward, no UE support, 5 laps, alternating leading with each LE, to encourage improved right knee ROM  Exercise 19: large hurdles, laterally, no UE support, 5 laps, alternating leading with each LE, to encourage improved right knee ROM  Exercise 20: HEP: continue with current + side stepping & monster walks    Modalities:  Vasopneumatic Device (CPT 63311)  Patient Position: Supine  Vasopneumatic Specified Location: right knee  Pre-Girth Measurement: 40.1 cm  Post-Girth Measurement: 40.0 cm  Post treatment skin assessment: Redness - no adverse reaction  Untimed (minutes): 10       *Indicates exercise, modality, or manual techniques to be initiated when appropriate    Objective Measures:       Strength: [x] NT  [] MMT completed:       ROM: [] NT  [x] ROM measurements:         AROM RLE (degrees)  RLE General AROM: knee flexion = 97*; knee extension = lacking 2*       Assessment: Body Structures, Functions, Activity Limitations Requiring Skilled Therapeutic Intervention: Decreased functional mobility , Decreased ROM, Decreased strength, Increased pain, Decreased endurance, Decreased balance, Decreased posture  Assessment: Continued with patient's PT POC this date with focus on improving patient's right knee ROM & strength through exercise/stretches. Addition of large rachna exercises to encourage improved right knee ROM in weight bearing positions. Addition of side stepping & monster walks for improved bilateral LE strength & endurance. Improvements noted in both right knee extension & flexion this date with minimal to no pain. Compression & ice to right knee post-treatment for inflammation control.   Treatment Diagnosis: decreased ability to perform functional mobility tasks, decreased pain free right knee ROM & strength, increased right knee pain, decreased functional endurance & balance, & impaired postural awareness/equal weight bearing bilateral LEs      Post-Pain Assessment:       Pain Rating (0-10 pain scale):  2 /10   Location and pain description same as pre-treatment unless indicated. Action: [] NA   [x] Perform HEP  [] Meds as prescribed  [x] Modalities as prescribed   [] Call Physician     GOALS   Patient Goal(s): Patient goals : \"to be able to bend and straighten knee\"    Short Term Goals Completed by 2-4 weeks Goal Status   STG 1 The patient will have an increase in LEFS score >/=9 points in order to increase functional activity tolerance In progress   STG 2 The patient will self-report ability to perform 10 stairs consistently with reciprocal stair pattern in order to demonstrate improved ability to perform functional mobility tasks. In progress     Long Term Goals Completed by 4-6 weeks Goal Status   LTG 1 The patient will demonstrate competency with HEP to progress towards self management of symptoms upon D/C In progress   LTG 2 The patient will improve pain free right knee AROM >/= 75% of pain free left knee AROM in order to increase ability to perform functional mobility tasks efficiently. In progress   LTG 3 The patient will report decreased right knee pain </=1/10 consistently in order to improve ability to perform functional mobility tasks In progress   LTG 4 The patient will demonstrate improved R LE strength >/= L LE strength in order to perform functional mobility tasks with increased ease. In progress   LTG 5 The patient will self-report ability to ambulate >10-15 minutes with no gait deviations in order to demonstrate improved functional endurance.  In progress       Plan:  Frequency/Duration:  Plan  Plan Frequency: 1-2xs/wk  Plan weeks: 4-6 weeks  Current Treatment Recommendations: Strengthening, ROM, Functional mobility training, Neuromuscular re-education, Manual Therapy - Soft Tissue Mobilization, Manual Therapy - Joint Manipulation, Endurance training, Patient/Caregiver education & training, Safety education & training, Home exercise program, Pain management, Modalities, Positioning, Aquatics, Therapeutic activities  Pt to continue current HEP. See objective section for any therapeutic exercise changes, additions or modifications this date.     Therapy Time:      PT Individual Minutes  Time In: 0957  Time Out: 7898  Minutes: 48  Timed Code Treatment Minutes: 38 Minutes  Procedure Minutes:10 minutes compression + CP  Timed Activity Minutes Units   Ther Ex 38 3   Electronically signed by Jona Bailey PT on 9/1/22 at 10:56 AM EDT

## 2022-09-06 ENCOUNTER — APPOINTMENT (OUTPATIENT)
Dept: PHYSICAL THERAPY | Age: 27
End: 2022-09-06
Payer: COMMERCIAL

## 2022-09-08 ENCOUNTER — HOSPITAL ENCOUNTER (OUTPATIENT)
Dept: PHYSICAL THERAPY | Age: 27
Setting detail: THERAPIES SERIES
Discharge: HOME | End: 2022-09-08
Payer: COMMERCIAL

## 2022-09-08 PROCEDURE — 97110 THERAPEUTIC EXERCISES: CPT

## 2022-09-08 NOTE — PROGRESS NOTES
[facility]  Outpatient Physical Therapy    Treatment Note        Date: 2022  Patient: Ana Never  : 1995   Confirmed: Yes  MRN: [id number]  Referring Provider: Felipe Marrero MD    Medical Diagnosis: Unspecified dislocation of right patella, initial encounter [S83.004A]  Other tear of medial meniscus, current injury, right knee, subsequent encounter [S83.241D]       Treatment Diagnosis: decreased ability to perform functional mobility tasks, decreased pain free right knee ROM & strength, increased right knee pain, decreased functional endurance & balance, & impaired postural awareness/equal weight bearing bilateral LEs    Visit Information:  Insurance: Payor: Sonny Jeter / Plan: Carolyn Patel / Product Type: *No Product type* /   PT Visit Information  Onset Date: 22  Total # of Visits Approved:  (48 units (22-22))  Total # of Visits to Date: 6  Plan of Care/Certification Expiration Date: 22  No Show: 0  Canceled Appointment: 0  Progress Note Counter: - (21/48 units 22-22) corrected unit count 22    Subjective Information:  Subjective: Patient reports starting new job Tuesday. Patient reports with no right knee pain.   HEP Compliance:  [x] Good [] Fair [] Poor [] Reports not doing due to:    Pain Screening  Patient Currently in Pain: Denies    Treatment:  Exercises:  Exercises  Exercise 1: SLS with forward reaching & sideways reaching 3-way, 2 sets x 10 reps each direction, RLE only  Exercise 2: SLR with quad sets, 2 sets x 20 reps x 5 second hold  Exercise 4: supine, heel slides with strap, 1 sets x 20 reps x 5 second hold  Exercise 11: hip 4-way, RTB, standing, 1 set x 15 reps each motion, bilateral LEs  Exercise 12: TKE, standing, RTB, 2 set x 15 reps x 5 second hold  Exercise 13: upright bike, 6 minutes, seat 5, full revoluions achieved - low seat next time  Exercise 20: HEP: continue with current + hip 4-way & TKE & SLS    Modalities:  Cryotherapy (CPT 36947)  Patient Position: Supine  Number Minutes Cryotherapy: 10  Cryotherapy location: Right, Knee  Post treatment skin assessment: Intact       *Indicates exercise, modality, or manual techniques to be initiated when appropriate    Objective Measures:     Strength: [x] NT  [] MMT completed:    ROM: [] NT  [x] ROM measurements:      AROM RLE (degrees)  RLE General AROM: knee flexion = 114*; knee extension = 0*     Assessment: Body Structures, Functions, Activity Limitations Requiring Skilled Therapeutic Intervention: Decreased functional mobility , Decreased ROM, Decreased strength, Increased pain, Decreased endurance, Decreased balance, Decreased posture  Assessment: Patient achieved full revolutions on upright bike this date with right LE with no pain. Addition weight bearing exercises to improve patient's right LE strength & endurance & balance, hip 4-way & TKE. Addition of SLS exercises with RLE to continue to improve patient's ability to weight bear on RLE without pain & with improved strength & endurance. Significant mprovements noted in both right knee extension & flexion this date with minimal to no pain with full right knee extension achieved today. Concluded with CP for pain relief. Treatment Diagnosis: decreased ability to perform functional mobility tasks, decreased pain free right knee ROM & strength, increased right knee pain, decreased functional endurance & balance, & impaired postural awareness/equal weight bearing bilateral LEs      Post-Pain Assessment:       Pain Rating (0-10 pain scale):   0/10   Location and pain description same as pre-treatment unless indicated.    Action: [] NA   [x] Perform HEP  [] Meds as prescribed  [x] Modalities as prescribed   [] Call Physician     GOALS   Patient Goal(s): Patient goals : \"to be able to bend and straighten knee\"    Short Term Goals Completed by 2-4 weeks Goal Status   STG 1 The patient will have an increase in LEFS score >/=9 points in order to increase functional activity tolerance In progress   STG 2 The patient will self-report ability to perform 10 stairs consistently with reciprocal stair pattern in order to demonstrate improved ability to perform functional mobility tasks. In progress     Long Term Goals Completed by 4-6 weeks Goal Status   LTG 1 The patient will demonstrate competency with HEP to progress towards self management of symptoms upon D/C In progress   LTG 2 The patient will improve pain free right knee AROM >/= 75% of pain free left knee AROM in order to increase ability to perform functional mobility tasks efficiently. In progress   LTG 3 The patient will report decreased right knee pain </=1/10 consistently in order to improve ability to perform functional mobility tasks In progress   LTG 4 The patient will demonstrate improved R LE strength >/= L LE strength in order to perform functional mobility tasks with increased ease. In progress   LTG 5 The patient will self-report ability to ambulate >10-15 minutes with no gait deviations in order to demonstrate improved functional endurance. In progress       Plan:  Frequency/Duration:  Plan  Plan Frequency: 1-2xs/wk  Plan weeks: 4-6 weeks  Current Treatment Recommendations: Strengthening, ROM, Functional mobility training, Neuromuscular re-education, Manual Therapy - Soft Tissue Mobilization, Manual Therapy - Joint Manipulation, Endurance training, Patient/Caregiver education & training, Safety education & training, Home exercise program, Pain management, Modalities, Positioning, Aquatics, Therapeutic activities  Pt to continue current HEP. See objective section for any therapeutic exercise changes, additions or modifications this date.     Therapy Time:      PT Individual Minutes  Time In: 1700  Time Out: 8561  Minutes: 48  Timed Code Treatment Minutes: 38 Minutes  Procedure Minutes: 10 minutes CP  Timed Activity Minutes Units   Ther Ex 38 3 Electronically signed by Ludie Siemens, PT on 9/8/22 at 5:39 PM EDT

## 2022-09-12 ENCOUNTER — HOSPITAL ENCOUNTER (OUTPATIENT)
Dept: PHYSICAL THERAPY | Age: 27
Setting detail: THERAPIES SERIES
Discharge: HOME | End: 2022-09-12
Payer: COMMERCIAL

## 2022-09-12 PROCEDURE — 97110 THERAPEUTIC EXERCISES: CPT

## 2022-09-12 NOTE — PROGRESS NOTES
[facility]  Outpatient Physical Therapy    Treatment Note        Date: 2022  Patient: Paty Chau  : 1995   Confirmed: Yes  MRN: [id number]  Referring Provider: Sonya Meeks MD    Medical Diagnosis: Unspecified dislocation of right patella, initial encounter [S83.004A]  Other tear of medial meniscus, current injury, right knee, subsequent encounter [S83.241D]       Treatment Diagnosis: decreased ability to perform functional mobility tasks, decreased pain free right knee ROM & strength, increased right knee pain, decreased functional endurance & balance, & impaired postural awareness/equal weight bearing bilateral LEs    Visit Information:  Insurance: Payor: Dolores Garnica / Plan: [employer] / Product Type: *No Product type* /   PT Visit Information  Onset Date: 22  PT Insurance Information: [employer]  Total # of Visits Approved:  (48 units (22-22))  Total # of Visits to Date: 7  Plan of Care/Certification Expiration Date: 22  No Show: 0  Canceled Appointment: 0  Progress Note Counter: - (24/48 units 22-22) corrected unit count 22    Subjective Information:  Subjective: patient reports she is doing good, no pain.  compliant with HEP and states her knee is getting alot stronger  HEP Compliance:  [x] Good [] Fair [] Poor [] Reports not doing due to:    Pain Screening  Patient Currently in Pain: Denies    Treatment:  Exercises:  Exercises  Exercise 10: 6\" step-ups fwd/lat x20 each  Exercise 11: hip 4-way, 1# on mat, 1 set x 15 reps each motion RLE only  Exercise 13: upright bike, 6 minutes, seat 4  Exercise 14: hamstring & lunge stretch on steps, 1 set x 35reps x 20-30 second hold RLE each stretch  Exercise 18: large hurdles, forward & lateral, no UE support, 3 laps, alternating leading with each LE, to encourage improved right knee ROM  Exercise 19: Prone hamstring curl 1# ankle weight 2x10 ; LAQ 1# ankle weight 2x10     *Indicates exercise, modality, or manual techniques to be initiated when appropriate    Objective Measures:    Strength: [x] NT  [] MMT completed:     ROM: [] NT  [] ROM measurements:         AROM RLE (degrees)  RLE General AROM: Knee flexion= 110 ; knee extension: -5      Assessment: Body Structures, Functions, Activity Limitations Requiring Skilled Therapeutic Intervention: Decreased functional mobility , Decreased ROM, Decreased strength, Increased pain, Decreased endurance, Decreased balance, Decreased posture  Assessment: Cont per patient poc for strengthening, ROM and stability. Patient was able to complete full revolutions on upright bike with lower seat height. Progressed activities by utilizing ankle weight this session with good tolerance from patient. Measured R knee flexion and ext with patient demonstrated decreased mobility compared to last session demonstrating inc joint capusle this date compared to previous. Cont to progress patient as tolerated. Treatment Diagnosis: decreased ability to perform functional mobility tasks, decreased pain free right knee ROM & strength, increased right knee pain, decreased functional endurance & balance, & impaired postural awareness/equal weight bearing bilateral LEs  Therapy Prognosis: Good, Excellent          Post-Pain Assessment:       Pain Rating (0-10 pain scale):   0/10   Location and pain description same as pre-treatment unless indicated.    Action: [] NA   [x] Perform HEP  [] Meds as prescribed  [] Modalities as prescribed   [] Call Physician     GOALS   Patient Goal(s): Patient goals : \"to be able to bend and straighten knee\"    Short Term Goals Completed by 2-4 weeks Goal Status   STG 1 The patient will have an increase in LEFS score >/=9 points in order to increase functional activity tolerance In progress   STG 2 The patient will self-report ability to perform 10 stairs consistently with reciprocal stair pattern in order to demonstrate improved ability to perform functional mobility tasks. In progress       Long Term Goals Completed by 4-6 weeks Goal Status   LTG 1 The patient will demonstrate competency with HEP to progress towards self management of symptoms upon D/C In progress   LTG 2 The patient will improve pain free right knee AROM >/= 75% of pain free left knee AROM in order to increase ability to perform functional mobility tasks efficiently. In progress   LTG 3 The patient will report decreased right knee pain </=1/10 consistently in order to improve ability to perform functional mobility tasks In progress   LTG 4 The patient will demonstrate improved R LE strength >/= L LE strength in order to perform functional mobility tasks with increased ease. In progress   LTG 5 The patient will self-report ability to ambulate >10-15 minutes with no gait deviations in order to demonstrate improved functional endurance. In progress            Plan:  Frequency/Duration:  Plan  Plan Frequency: 1-2xs/wk  Plan weeks: 4-6 weeks  Current Treatment Recommendations: Strengthening, ROM, Functional mobility training, Neuromuscular re-education, Manual Therapy - Soft Tissue Mobilization, Manual Therapy - Joint Manipulation, Endurance training, Patient/Caregiver education & training, Safety education & training, Home exercise program, Pain management, Modalities, Positioning, Aquatics, Therapeutic activities  Pt to continue current HEP. See objective section for any therapeutic exercise changes, additions or modifications this date.     Therapy Time:      PT Individual Minutes  Time In: 9764  Time Out: 1601  Minutes: 38  Timed Code Treatment Minutes: 38 Minutes  Procedure Minutes: 0  Timed Activity Minutes Units   Ther Ex 38 3     Electronically signed by Michell Meek PT on 9/12/22 at 5:51 PM EDT

## 2022-09-15 ENCOUNTER — HOSPITAL ENCOUNTER (OUTPATIENT)
Dept: PHYSICAL THERAPY | Age: 27
Setting detail: THERAPIES SERIES
Discharge: HOME | End: 2022-09-15
Payer: COMMERCIAL

## 2022-09-15 PROCEDURE — 97110 THERAPEUTIC EXERCISES: CPT

## 2022-09-15 NOTE — PROGRESS NOTES
[facility]  Outpatient Physical Therapy    Treatment Note        Date: 9/15/2022  Patient: Donald Tirado  : 1995   Confirmed: Yes  MRN: [id number]  Referring Provider: Vicky Choudhury MD    Medical Diagnosis: Unspecified dislocation of right patella, initial encounter [S83.004A]  Other tear of medial meniscus, current injury, right knee, subsequent encounter [S83.241D]       Treatment Diagnosis: decreased ability to perform functional mobility tasks, decreased pain free right knee ROM & strength, increased right knee pain, decreased functional endurance & balance, & impaired postural awareness/equal weight bearing bilateral LEs    Visit Information:  Insurance: Payor: Lucas Chaidez / Plan: Cristiana Hahn / Product Type: *No Product type* /   PT Visit Information  Onset Date: 22  PT Insurance Information: [employer]  Total # of Visits Approved:  (48 units (22-22))  Total # of Visits to Date: 8  Plan of Care/Certification Expiration Date: 22  No Show: 0  Canceled Appointment: 0  Progress Note Counter: - (27/48 units 22-22)    Subjective Information:  Subjective: patient reports she is doing good. showed up about 10 min late to session, able to accomodate this date.  States HEP is going well, just feels a little tightness in her knee  HEP Compliance:  [x] Good [] Fair [] Poor [] Reports not doing due to:    Pain Screening  Patient Currently in Pain: Denies    Treatment:  Exercises:  Exercises  Exercise 7: side stepping along counter, no UE support, 5 laps, RTB around feet (focus on glute med strenghtening)  Exercise 8: TG L8 Vance squats x10 ; R SL eccentric x10  Exercise 10: 6\" step-ups fwd/lat x20 each RLE ; 4\" step downs RLE 2x10  Exercise 11: hip 4-way, 1# on mat, 1 set x 15 reps each motion RLE only  Exercise 12: TKE, standing, CC 2pl 10x10\" holds  Exercise 13: upright bike, 6 minutes, seat 3  Exercise 14: lunge stretch on steps, 10 x 10 second hold RLE  Exercise 15: prone knee extension hang, 5# ankle weight, RLE, 2.5 min  Exercise 19: Prone hamstring curl 1# ankle weight 2x10 ; LAQ 1# ankle weight 2x10  Exercise 20: HEP: continue with current     *Indicates exercise, modality, or manual techniques to be initiated when appropriate    Objective Measures:    Strength: [x] NT  [] MMT completed:     ROM: [] NT  [x] ROM measurements:         AROM RLE (degrees)  RLE General AROM: Knee flex: 120; knee ext: -7        Assessment: Body Structures, Functions, Activity Limitations Requiring Skilled Therapeutic Intervention: Decreased functional mobility , Decreased ROM, Decreased strength, Increased pain, Decreased endurance, Decreased balance, Decreased posture  Assessment: Patient presented to therapy with no pain however endorse R knee tightness and difficulty with going down the steps. Edema present, instructed patient efflurage to help reduce fluid build up in tissues. Introduced step-downs and eccentric squats to strength quadriceps and prepare body for weight acceptance when decending steps at home and in the community. Cont with strengthening and stretching as tolerated. Treatment Diagnosis: decreased ability to perform functional mobility tasks, decreased pain free right knee ROM & strength, increased right knee pain, decreased functional endurance & balance, & impaired postural awareness/equal weight bearing bilateral LEs  Therapy Prognosis: Good, Excellent          Post-Pain Assessment:       Pain Rating (0-10 pain scale):  \"tightness\" /10   Location and pain description same as pre-treatment unless indicated.    Action: [] NA   [x] Perform HEP  [] Meds as prescribed  [] Modalities as prescribed   [] Call Physician     GOALS   Patient Goal(s): Patient goals : \"to be able to bend and straighten knee\"    Short Term Goals Completed by 2-4 weeks Goal Status   STG 1 The patient will have an increase in LEFS score >/=9 points in order to increase functional activity tolerance In progress   STG 2 The patient will self-report ability to perform 10 stairs consistently with reciprocal stair pattern in order to demonstrate improved ability to perform functional mobility tasks. In progress       Long Term Goals Completed by 4-6 weeks Goal Status   LTG 1 The patient will demonstrate competency with HEP to progress towards self management of symptoms upon D/C In progress   LTG 2 The patient will improve pain free right knee AROM >/= 75% of pain free left knee AROM in order to increase ability to perform functional mobility tasks efficiently. In progress   LTG 3 The patient will report decreased right knee pain </=1/10 consistently in order to improve ability to perform functional mobility tasks In progress   LTG 4 The patient will demonstrate improved R LE strength >/= L LE strength in order to perform functional mobility tasks with increased ease. In progress   LTG 5 The patient will self-report ability to ambulate >10-15 minutes with no gait deviations in order to demonstrate improved functional endurance. In progress            Plan:  Frequency/Duration:  Plan  Plan Frequency: 1-2xs/wk  Plan weeks: 4-6 weeks  Current Treatment Recommendations: Strengthening, ROM, Functional mobility training, Neuromuscular re-education, Manual Therapy - Soft Tissue Mobilization, Manual Therapy - Joint Manipulation, Endurance training, Patient/Caregiver education & training, Safety education & training, Home exercise program, Pain management, Modalities, Positioning, Aquatics, Therapeutic activities  Pt to continue current HEP. See objective section for any therapeutic exercise changes, additions or modifications this date.     Therapy Time:      PT Individual Minutes  Time In: 3014  Time Out: 1750  Minutes: 41  Timed Code Treatment Minutes: 41 Minutes  Procedure Minutes:  Timed Activity Minutes Units   Ther Ex 41 3     Electronically signed by Roro Henry PT on 9/15/22 at 5:57 PM EDT

## 2022-09-19 ENCOUNTER — HOSPITAL ENCOUNTER (OUTPATIENT)
Dept: PHYSICAL THERAPY | Age: 27
Setting detail: THERAPIES SERIES
Discharge: HOME | End: 2022-09-19
Payer: COMMERCIAL

## 2022-09-19 NOTE — PROGRESS NOTES
Therapy                            Cancellation/No-show Note    Date: 2022  Patient: Santana Keller (66 y.o. female)  : 1995  MRN:  [id number]  Referring Physician: Veena Fischer MD    Medical Diagnosis: Unspecified dislocation of right patella, initial encounter [S83.004A]  Other tear of medial meniscus, current injury, right knee, subsequent encounter [S83.241D]      Visit Information:  Insurance: Payor: Arpita Lee / Plan: Maurice Smith / Product Type: *No Product type* /   Visits to Date: 8   No Show/Cancelled Appts: 0 /       For today's appointment patient:  [x]  Cancelled  []  Rescheduled appointment  []  No-show   []  Called pt to remind of next appointment     Reason given by patient:  []  Patient ill  []  Conflicting appointment  []  No transportation    []  Conflict with work  []  No reason given  [x]  Other:  patient would not be able to get her until [address] [place], stuck in traffic    [] Pt has future appointments scheduled, no follow up needed  [] Pt requests to be on hold.     Reason:   If > 2 weeks please discuss with therapist.  [] Therapist to call pt for follow up     Comments:       Signature: Electronically signed by Emmy Patel PT on 22 at 5:10 PM EDT

## 2022-09-22 ENCOUNTER — HOSPITAL ENCOUNTER (OUTPATIENT)
Dept: PHYSICAL THERAPY | Age: 27
Setting detail: THERAPIES SERIES
Discharge: HOME | End: 2022-09-22
Payer: COMMERCIAL

## 2022-09-22 NOTE — PROGRESS NOTES
[address]       Physical Therapy  Cancellation/No-show Note  Patient Name:  Jenni Rudolph  :  1995   Date:  2022     Visit Information:  PT Visit Information  Onset Date: 22  PT Insurance Information: [employer]  Total # of Visits Approved:  (48 units (22-22))  Total # of Visits to Date: 8  Plan of Care/Certification Expiration Date: 22  No Show: 0  Canceled Appointment: 2  Progress Note Counter: - (27/48 units 22-22) CX - 22 traffic    For today's appointment patient:  [x]  Cancelled  []  Rescheduled appointment  []  No-show   []  Called pt to remind of next appointment     Reason given by patient:  []  Patient ill  []  Conflicting appointment  []  No transportation    []  Conflict with work  []  No reason given  [x]  Other:  traffic     Comments:       Signature: Electronically signed by Frank Cabrera PT on 22 at 4:51 PM EDT

## 2022-10-05 ENCOUNTER — HOSPITAL ENCOUNTER (OUTPATIENT)
Dept: PHYSICAL THERAPY | Age: 27
Setting detail: THERAPIES SERIES
Discharge: HOME | End: 2022-10-05
Payer: COMMERCIAL

## 2022-10-05 PROCEDURE — 97110 THERAPEUTIC EXERCISES: CPT

## 2022-10-05 NOTE — PROGRESS NOTES
[address] [facility], [address]     Ph: [phone]  Fax: [phone]      [] Certification  [] Recertification []  Plan of Care  [] Progress Note [x] Discharge      Referring Provider: Ara Sandra MD     From:  Dorinda Schaumann, PT, DPT  Patient: Mayra Ramos (32 y.o. female) : 1995 Date: 10/5/2022  Medical Diagnosis: Unspecified dislocation of right patella, initial encounter [H48.340A]  Other tear of medial meniscus, current injury, right knee, subsequent encounter [V67.357Y]         Plan of Care/Certification Expiration Date: : 10/14/22   Progress Report Period from:  2022  to 10/5/2022    Visits to Date: 9 No Show: 0 Cancelled Appts: 2    OBJECTIVE:   Short Term Goals - Time Frame for Short Term Goals: 2-4 weeks    Goals Current/Discharge status  Status   Short Term Goal 1: The patient will have an increase in LEFS score >/=9 points in order to increase functional activity tolerance  Exam: LEFS = 79/80  Met   Short Term Goal 2: The patient will self-report ability to perform 10 stairs consistently with reciprocal stair pattern in order to demonstrate improved ability to perform functional mobility tasks. 10 stairs, no rails, reciprocal stair pattern Met     Long Term Goals - Time Frame for Long Term Goals : 4-6 weeks  Goals Current/ Discharge status Status   Long Term Goal 1: The patient will demonstrate competency with HEP to progress towards self management of symptoms upon D/C Independent/compliant Met   Long Term Goal 2: The patient will improve pain free right knee AROM >/= 75% of pain free left knee AROM in order to increase ability to perform functional mobility tasks efficiently.   AROM LLE (degrees)  LLE General AROM: knee flexion = 130*; knee extension = 0*   AROM RLE (degrees)  RLE General AROM: Knee flex: 118*; knee ext: 0*  Met   Long Term Goal 3: The patient will report decreased right knee pain </=1/10 consistently in order to improve ability to perform functional mobility tasks Pain Screening  Patient Currently in Pain: Denies    Met   Long Term Goal 4: The patient will demonstrate improved R LE strength >/= L LE strength in order to perform functional mobility tasks with increased ease. Strength LLE  Comment: hip flexion = 4+/5; hip extension = 4/5; hip abduction = 4/5; hip adduction = 4/5; knee extension = 5/5; knee flexion = 5/5; SLR = 4+/5  Strength RLE  Comment: hip flexion = 4+/5; hip extension = 4/5; hip abduction =4/5; hip adduction = 4/5; knee extension = 5/5; knee flexion = 5/5; SLR = 5/5  Met   Long Term Goal 5: The patient will self-report ability to ambulate >10-15 minutes with no gait deviations in order to demonstrate improved functional endurance. Unlimited distances Met     Assessment: Patient is a 27year old female who presented on 8/5/22 with acute right knee pain that began 7/4/22 following stepping on uneven surface who has participated in 9 outpatient PT sessions 8/5/22-10/5/22. Patient has made significant progress towards goals since initial evaluation, completely meeting all goals. Patient is appropriate for discharge this date with recommendation for patient to continue to perform PT HEP regularly every day as well as to follow-up with MD tomorrow as scheduled. Patient is interested in returning to the gym next week if MD clears her tomorrow. No restrictions from a physical therapy stand point. PLAN:   Frequency/Duration:  Additional Comments: Discharge                           Patient Status:[] Continue/ Initiate plan of Care    [x] Discharge PT. Recommend pt continue with HEP. [] Additional visits requested, Please re-certify for additional visits:    [] Hold         Signature: Electronically signed by Sandy Helton PT on 10/5/22 at 5:33 PM EDT      If you have any questions or concerns, please don't hesitate to call.   Thank you for your referral.    I have reviewed this plan of care and certify a need for medically necessary rehabilitation services.     Physician Signature:__________________________________________________________  Date:  Please sign and return

## 2022-10-05 NOTE — PROGRESS NOTES
[facility]  Outpatient Physical Therapy    Treatment Note        Date: 10/5/2022  Patient: Trudi Pop  : 1995   Confirmed: Yes  MRN: [id number]  Referring Provider: Adela Hickman MD    Medical Diagnosis: Unspecified dislocation of right patella, initial encounter [S83.004A]  Other tear of medial meniscus, current injury, right knee, subsequent encounter [S83.241D]            Visit Information:  Insurance: Payor: Darnell Monsivais / Plan: Nitish Maya / Product Type: *No Product type* /   PT Visit Information  Onset Date: 22  PT Insurance Information: [facility]  Total # of Visits Approved:  (48 units (22-22))  Total # of Visits to Date: 9  Plan of Care/Certification Expiration Date: 10/14/22  No Show: 0  Canceled Appointment: 2  Progress Note Counter: - (29/48 units extended to 10/14/22)    Subjective Information:  Subjective: Patient reports she is ready for discharge. Only current difficulty sometimes is the stairs.   HEP Compliance:  [x] Good [] Fair [] Poor [] Reports not doing due to:    Pain Screening  Patient Currently in Pain: Denies    Treatment:  Exercises:  Exercises  Exercise 19: objective measurements  Exercise 20: HEP: continue with current + addition of side lying clams/reverse clams/hip abduction/hip adduction & step taps & step downs    *Indicates exercise, modality, or manual techniques to be initiated when appropriate    Objective Measures:     Ambulation  Device: No Device  Assistance: Independent  Gait Deviations: None  Distance: unlimited distances  More Ambulation?: No    Stairs  # Steps : 12  Stairs Height: 6\"  Rails: None  Device: No Device  Assistance: Independent  Comment: reciprocal stair pattern    Strength: [] NT  [x] MMT completed:  Strength RLE  Comment: hip flexion = 4+/5; hip extension = 4/5; hip abduction =4/5; hip adduction = 4/5; knee extension = 5/5; knee flexion = 5/5; SLR = 5/5  Strength LLE  Comment: hip flexion = 4+/5; hip extension = 4/5; hip abduction = 4/5; hip adduction = 4/5; knee extension = 5/5; knee flexion = 5/5; SLR = 4+/5    ROM: [] NT  [x] ROM measurements:     AROM LLE (degrees)  LLE General AROM: knee flexion = 130*; knee extension = 0*   AROM RLE (degrees)  RLE General AROM: Knee flex: 118*; knee ext: 0*     Assessment:      Assessment: Patient is a 27year old female who presented on 8/5/22 with acute right knee pain that began 7/4/22 following stepping on uneven surface who has participated in 9 outpatient PT sessions 8/5/22-10/5/22. Patient has made significant progress towards goals since initial evaluation, completely meeting all goals. Patient is appropriate for discharge this date with recommendation for patient to continue to perform PT HEP regularly every day as well as to follow-up with MD tomorrow as scheduled. Patient is interested in returning to the gym next week if MD clears her tomorrow. No restrictions from a physical therapy stand point. Post-Pain Assessment:       Pain Rating (0-10 pain scale):   0/10   Location and pain description same as pre-treatment unless indicated. Action: [] NA   [x] Perform HEP  [] Meds as prescribed  [x] Modalities as prescribed   [] Call Physician     GOALS   Patient Goal(s): Patient Goals : \"to be able to bend and straighten knee\"    Short Term Goals Completed by 2-4 weeks Goal Status   STG 1 The patient will have an increase in LEFS score >/=9 points in order to increase functional activity tolerance Met   STG 2 The patient will self-report ability to perform 10 stairs consistently with reciprocal stair pattern in order to demonstrate improved ability to perform functional mobility tasks.  Met     Long Term Goals Completed by 4-6 weeks Goal Status   LTG 1 The patient will demonstrate competency with HEP to progress towards self management of symptoms upon D/C Met   LTG 2 The patient will improve pain free right knee AROM >/= 75% of pain free left knee AROM in order to increase ability to perform functional mobility tasks efficiently. Met   LTG 3 The patient will report decreased right knee pain </=1/10 consistently in order to improve ability to perform functional mobility tasks Met   LTG 4 The patient will demonstrate improved R LE strength >/= L LE strength in order to perform functional mobility tasks with increased ease. Met   LTG 5 The patient will self-report ability to ambulate >10-15 minutes with no gait deviations in order to demonstrate improved functional endurance. Met       Plan:  Frequency/Duration:  Plan  Additional Comments: Discharge  Pt to continue current HEP. See objective section for any therapeutic exercise changes, additions or modifications this date.     Therapy Time:      PT Individual Minutes  Time In: 1700  Time Out: 1725  Minutes: 25  Timed Code Treatment Minutes: 25 Minutes  Procedure Minutes: 0 minutes  Timed Activity Minutes Units   Ther Ex 25 2   Electronically signed by Mesfin Michael PT on 10/5/22 at 5:31 PM EDT

## 2022-10-06 ENCOUNTER — OFFICE VISIT (OUTPATIENT)
Dept: ORTHOPEDIC SURGERY | Age: 27
End: 2022-10-06
Payer: COMMERCIAL

## 2022-10-06 VITALS
WEIGHT: 160 LBS | TEMPERATURE: 98.7 F | OXYGEN SATURATION: 99 % | HEART RATE: 56 BPM | BODY MASS INDEX: 27.31 KG/M2 | HEIGHT: 64 IN

## 2022-10-06 DIAGNOSIS — S83.241D TEAR OF MEDIAL MENISCUS OF RIGHT KNEE, CURRENT, UNSPECIFIED TEAR TYPE, SUBSEQUENT ENCOUNTER: ICD-10-CM

## 2022-10-06 DIAGNOSIS — S83.004D DISLOCATION OF RIGHT PATELLA, SUBSEQUENT ENCOUNTER: Primary | ICD-10-CM

## 2022-10-06 PROCEDURE — 1036F TOBACCO NON-USER: CPT | Performed by: ORTHOPAEDIC SURGERY

## 2022-10-06 PROCEDURE — G8419 CALC BMI OUT NRM PARAM NOF/U: HCPCS | Performed by: ORTHOPAEDIC SURGERY

## 2022-10-06 PROCEDURE — G8427 DOCREV CUR MEDS BY ELIG CLIN: HCPCS | Performed by: ORTHOPAEDIC SURGERY

## 2022-10-06 PROCEDURE — 99214 OFFICE O/P EST MOD 30 MIN: CPT | Performed by: ORTHOPAEDIC SURGERY

## 2022-10-06 PROCEDURE — G8484 FLU IMMUNIZE NO ADMIN: HCPCS | Performed by: ORTHOPAEDIC SURGERY

## 2022-10-07 NOTE — PROGRESS NOTES
Patient ID:  Sarah Pelaez is a 32 y.o. female who presents today for follow up of right talar dislocation. She had an MRI which also showed that she had a small tear of the medial meniscus. She is gone to physical therapy and accommodative brace wear. She comes in without complaint. Injury: yes; patella dislocation a few months ago    Location: No pain presently  Pain: no; 0 on a scale from 1 to 10  Onset: sudden  Duration: 2 months  Frequency:   The pain was constant now it is gone  Quality: No pain  Swelling: patient does not note any swelling of the joint  Aggravating factors:  None  Alleviating factors:  No pain  Mechanical symptoms: none  Radiation: no    Activities: walking independently  Restriction:  none  Progression:  improving    Previous treatment:   Physical therapy and brace wear  NSAIDs:  none  PT:  Physical therapy, completed    Medications:    Current Outpatient Medications on File Prior to Visit   Medication Sig Dispense Refill    ibuprofen (ADVIL;MOTRIN) 600 MG tablet  (Patient not taking: Reported on 10/6/2022)  2    ferrous sulfate (FE TABS) 325 (65 FE) MG EC tablet Take 1 tablet by mouth 3 times daily (with meals) (Patient not taking: Reported on 10/6/2022) 90 tablet 2     No current facility-administered medications on file prior to visit. Allergies:    No Known Allergies    Physical Exam:    Examination of the right knee    Gait:  normal  Inspection:  neutral  Swelling:  none  Erythema:  none  Ecchymosis:  none  Effusion:  none  Palpation:  none  Extension:  0  Flexion:  120  Strength:  5  Varus/Valgus stability:  none  Anterior/Posterior Instability:  none  Darin:  negative  Thessaly:  negative  Modified Apley:  negative  Lachman:  negative  Patellar compression:  negative  Neurological/Vascular:  Sensation grossly intact. Dorsalis pedis palpable and 2+    Radiographs:  None today    MRI: None    Diagnosis:   Diagnosis Orders   1.  Dislocation of right patella, subsequent encounter        2. Tear of medial meniscus of right knee, current, unspecified tear type, subsequent encounter            Plan:    The patient had a patellar dislocation. She received her reaction knee brace and went to physical therapy. She was compliant with therapy and brace wear. She comes in today reporting that she is having no pain. I stressed to her the importance and continuing to maintain the strength of the muscles and keep doing the exercises to prevent a subsequent dislocation. She expressed understanding. Follow-up as needed. No orders of the defined types were placed in this encounter. No orders of the defined types were placed in this encounter. No follow-ups on file.     Mitali Lugo MD